# Patient Record
Sex: FEMALE | Race: BLACK OR AFRICAN AMERICAN | NOT HISPANIC OR LATINO | Employment: FULL TIME | URBAN - METROPOLITAN AREA
[De-identification: names, ages, dates, MRNs, and addresses within clinical notes are randomized per-mention and may not be internally consistent; named-entity substitution may affect disease eponyms.]

---

## 2024-10-24 ENCOUNTER — TELEPHONE (OUTPATIENT)
Dept: OBSTETRICS AND GYNECOLOGY | Facility: CLINIC | Age: 29
End: 2024-10-24
Payer: COMMERCIAL

## 2024-10-24 NOTE — TELEPHONE ENCOUNTER
Caller: JOE PERERA    Relationship: SELF    Best call back number: [phone]    Who is your current provider: NEW OB APPT WITH Rogerio Martinez MD 11-1-24    Is your current provider offboarding? NO    Who would you like your new provider to be: PT IS REQUESTING FEMALE PROVIDER

## 2024-11-05 ENCOUNTER — INITIAL PRENATAL (OUTPATIENT)
Dept: OBSTETRICS AND GYNECOLOGY | Facility: CLINIC | Age: 29
End: 2024-11-05
Payer: COMMERCIAL

## 2024-11-05 VITALS — DIASTOLIC BLOOD PRESSURE: 64 MMHG | BODY MASS INDEX: 22.15 KG/M2 | SYSTOLIC BLOOD PRESSURE: 104 MMHG | WEIGHT: 113.4 LBS

## 2024-11-05 DIAGNOSIS — Z36.9 ENCOUNTER FOR ANTENATAL SCREENING: Primary | ICD-10-CM

## 2024-11-05 NOTE — PROGRESS NOTES
Initial ob visit     CC- Here for care of pregnancy        Li Stinson is a 29 y.o. female, , who presents for her first obstetrical visit.  Patient's last menstrual period was 2024.. Her ELVIS is 2025, by Ultrasound. Current GA is 12w6d.     Initial positive test date : 10/01/24, UPT        Her periods are every 21 days.  Prior obstetric issues: none  Patient's past medical history is significant for:  none .  Family history of genetic issues (includes FOB): none  Prior infections concerning in pregnancy (Rash, fever in last 2 weeks): No  Varicella Hx - history of chicken pox  Prior testing for Cystic Fibrosis Carrier or Sickle Cell Trait- none  Prepregnancy BMI - Body mass index is 22.15 kg/m².  History of STD: yes GC/CHLAMYDIA  Hx of HSV for patient or partner: no  Ultrasound Today: yes, SIUP.  bpm.    OB History    Para Term  AB Living   3 2 2     2   SAB IAB Ectopic Molar Multiple Live Births             2      # Outcome Date GA Lbr Igor/2nd Weight Sex Type Anes PTL Lv   3 Current            2 Term 17 38w0d   F Vag-Spont   ROE   1 Term 12 39w0d   M Vag-Spont   ROE       Additional Pertinent History   Last Pap : 10/01/24 Result: negative HPV: negative per pt     Last Completed Pap Smear       This patient has no relevant Health Maintenance data.          History of abnormal Pap smear: no  Family history of uterine, colon, breast, or ovarian cancer: no  Feelings of Anxiety or Depression: no  Tobacco Usage?: No   Alcohol/Drug Use?: NO  Over the age of 35 at delivery: no  Genetic Screening: desires cell free DNA  Flu Status: Declines    PMH  No current outpatient medications on file.     Past Medical History:   Diagnosis Date    Bacterial vaginosis     Trichimoniasis     Yeast vaginitis         Past Surgical History:   Procedure Laterality Date    NO PAST SURGERIES         Review of Systems   Review of Systems    Patient Reports:  none  Patient  Denies:excessive nausea , excessive vomiting, and vaginal bleeding  All systems reviewed and otherwise normal.    I have reviewed and agree with the HPI, ROS, and historical information as entered above. Kalia Alvarado MD      /64   Wt 51.4 kg (113 lb 6.4 oz)   LMP 2024   BMI 22.15 kg/m²     The additional following portions of the patient's history were reviewed and updated as appropriate: allergies, current medications, past family history, past medical history, past social history, past surgical history, and problem list.    Physical Exam  General:  well developed; well nourished  no acute distress  mentation appropriate   Chest/Respiratory: No labored breathing, normal respiratory effort, normal appearance, no respiratory noises noted   Heart:  normal rate, regular rhythm,  no murmurs, rubs, or gallops   Thyroid: normal to inspection and palpation   Breasts:  Not performed.   Abdomen: soft, non-tender; no masses  no umbilical or inguinal hernias are present  no hepato-splenomegaly   Pelvis: Not performed.        Assessment and Plan    Problem List Items Addressed This Visit    None  Visit Diagnoses       Encounter for  screening    -  Primary    Relevant Orders    Obstetric Panel    HIV-1 / O / 2 Ag / Antibody    Urine Culture - Urine, Urine, Clean Catch    Urinalysis With Microscopic - Urine, Clean Catch    Chlamydia trachomatis, Neisseria gonorrhoeae, PCR - Urine, Urine, Clean Catch    Urine Drug Screen - Urine, Clean Catch    [id number] PLUS Core+SCA+ESS - Blood,            Pregnancy at 12w6d  Reviewed routine prenatal care with the office and educational materials given  Discussed options for genetic testing including first trimester nuchal translucency screen, genetic disease carrier testing, quadruple screen, and NIPT  Return in about 4 weeks (around 12/3/2024).      Kaila Alvarado MD  2024

## 2024-11-06 ENCOUNTER — TELEPHONE (OUTPATIENT)
Dept: OBSTETRICS AND GYNECOLOGY | Facility: CLINIC | Age: 29
End: 2024-11-06
Payer: COMMERCIAL

## 2024-11-06 DIAGNOSIS — Z34.91 FIRST TRIMESTER PREGNANCY: Primary | ICD-10-CM

## 2024-11-06 LAB
ABO GROUP BLD: ABNORMAL
AMPHETAMINES UR QL SCN: NEGATIVE NG/ML
APPEARANCE UR: CLEAR
BACTERIA #/AREA URNS HPF: NORMAL /[HPF]
BARBITURATES UR QL SCN: NEGATIVE NG/ML
BASOPHILS # BLD AUTO: 0 X10E3/UL (ref 0–0.2)
BASOPHILS NFR BLD AUTO: 0 %
BENZODIAZ UR QL SCN: NEGATIVE NG/ML
BILIRUB UR QL STRIP: NEGATIVE
BLD GP AB SCN SERPL QL: NEGATIVE
BZE UR QL SCN: NEGATIVE NG/ML
CANNABINOIDS UR QL SCN: POSITIVE NG/ML
CASTS URNS QL MICRO: NORMAL /LPF
COLOR UR: YELLOW
CREAT UR-MCNC: 23.1 MG/DL (ref 20–300)
EOSINOPHIL # BLD AUTO: 0.1 X10E3/UL (ref 0–0.4)
EOSINOPHIL NFR BLD AUTO: 0 %
EPI CELLS #/AREA URNS HPF: NORMAL /HPF (ref 0–10)
ERYTHROCYTE [DISTWIDTH] IN BLOOD BY AUTOMATED COUNT: 13.3 % (ref 11.7–15.4)
GLUCOSE UR QL STRIP: NEGATIVE
HBV SURFACE AG SERPL QL IA: NEGATIVE
HCT VFR BLD AUTO: 39.5 % (ref 34–46.6)
HCV IGG SERPL QL IA: NON REACTIVE
HGB BLD-MCNC: 12.5 G/DL (ref 11.1–15.9)
HGB UR QL STRIP: NEGATIVE
HIV 1+2 AB+HIV1 P24 AG SERPL QL IA: NON REACTIVE
IMM GRANULOCYTES # BLD AUTO: 0 X10E3/UL (ref 0–0.1)
IMM GRANULOCYTES NFR BLD AUTO: 0 %
KETONES UR QL STRIP: NEGATIVE
LABORATORY COMMENT REPORT: ABNORMAL
LEUKOCYTE ESTERASE UR QL STRIP: ABNORMAL
LYMPHOCYTES # BLD AUTO: 3.3 X10E3/UL (ref 0.7–3.1)
LYMPHOCYTES NFR BLD AUTO: 26 %
MCH RBC QN AUTO: 29.6 PG (ref 26.6–33)
MCHC RBC AUTO-ENTMCNC: 31.6 G/DL (ref 31.5–35.7)
MCV RBC AUTO: 93 FL (ref 79–97)
METHADONE UR QL SCN: NEGATIVE NG/ML
MICRO URNS: ABNORMAL
MONOCYTES # BLD AUTO: 0.6 X10E3/UL (ref 0.1–0.9)
MONOCYTES NFR BLD AUTO: 4 %
NEUTROPHILS # BLD AUTO: 8.6 X10E3/UL (ref 1.4–7)
NEUTROPHILS NFR BLD AUTO: 70 %
NITRITE UR QL STRIP: NEGATIVE
OPIATES UR QL SCN: NEGATIVE NG/ML
OXYCODONE+OXYMORPHONE UR QL SCN: NEGATIVE NG/ML
PCP UR QL: NEGATIVE NG/ML
PH UR STRIP: 6.5 [PH] (ref 5–7.5)
PH UR: 5.9 [PH] (ref 4.5–8.9)
PLATELET # BLD AUTO: 383 X10E3/UL (ref 150–450)
PROPOXYPH UR QL SCN: NEGATIVE NG/ML
PROT UR QL STRIP: NEGATIVE
RBC # BLD AUTO: 4.23 X10E6/UL (ref 3.77–5.28)
RBC #/AREA URNS HPF: NORMAL /HPF (ref 0–2)
RH BLD: POSITIVE
RPR SER QL: NON REACTIVE
RUBV IGG SERPL IA-ACNC: 3.72 INDEX
SP GR UR STRIP: 1.01 (ref 1–1.03)
UROBILINOGEN UR STRIP-MCNC: 0.2 MG/DL (ref 0.2–1)
WBC # BLD AUTO: 12.5 X10E3/UL (ref 3.4–10.8)
WBC #/AREA URNS HPF: NORMAL /HPF (ref 0–5)

## 2024-11-06 RX ORDER — PRENATAL WITH FERROUS FUM AND FOLIC ACID 3080; 920; 120; 400; 22; 1.84; 3; 20; 10; 1; 12; 200; 27; 25; 2 [IU]/1; [IU]/1; MG/1; [IU]/1; MG/1; MG/1; MG/1; MG/1; MG/1; MG/1; UG/1; MG/1; MG/1; MG/1; MG/1
1 TABLET ORAL DAILY
Qty: 90 TABLET | Refills: 3 | Status: SHIPPED | OUTPATIENT
Start: 2024-11-06

## 2024-11-06 NOTE — TELEPHONE ENCOUNTER
Caller: Li Stinson    Relationship: Self    Best call back number:[phone]      Who are you requesting to speak with (clinical staff,DR. QUESADA    What was the call regarding: PATIENT ADVISED TO PLEASE USE THE [facility] AT  [address] [place] [address] [address], [place] [id number],  [phone], WHEN SENDING FOR PRENATAL VITAMINS.

## 2024-11-07 ENCOUNTER — TELEPHONE (OUTPATIENT)
Dept: OBSTETRICS AND GYNECOLOGY | Facility: CLINIC | Age: 29
End: 2024-11-07
Payer: COMMERCIAL

## 2024-11-07 LAB
C TRACH RRNA SPEC QL NAA+PROBE: NEGATIVE
N GONORRHOEA RRNA SPEC QL NAA+PROBE: NEGATIVE

## 2024-11-07 RX ORDER — NITROFURANTOIN 25; 75 MG/1; MG/1
100 CAPSULE ORAL 2 TIMES DAILY
Qty: 14 CAPSULE | Refills: 0 | Status: SHIPPED | OUTPATIENT
Start: 2024-11-07 | End: 2024-11-14

## 2024-11-07 NOTE — TELEPHONE ENCOUNTER
Called patient she wanted to know what medicine she needed to take for uti. Macrobid rx called in she vu we will retest urine next visit VU

## 2024-11-07 NOTE — TELEPHONE ENCOUNTER
Patient of Dr. Alvarado;  @ 12w 1d.  Attempted to return patient's call. Left voice message to call us back.

## 2024-11-08 LAB
BACTERIA UR CULT: ABNORMAL
BACTERIA UR CULT: ABNORMAL
OTHER ANTIBIOTIC SUSC ISLT: ABNORMAL

## 2024-12-03 ENCOUNTER — ROUTINE PRENATAL (OUTPATIENT)
Dept: OBSTETRICS AND GYNECOLOGY | Facility: CLINIC | Age: 29
End: 2024-12-03
Payer: COMMERCIAL

## 2024-12-03 VITALS — WEIGHT: 117.6 LBS | SYSTOLIC BLOOD PRESSURE: 120 MMHG | BODY MASS INDEX: 22.97 KG/M2 | DIASTOLIC BLOOD PRESSURE: 74 MMHG

## 2024-12-03 DIAGNOSIS — Z34.92 PRENATAL CARE IN SECOND TRIMESTER: Primary | ICD-10-CM

## 2024-12-03 DIAGNOSIS — O23.42 URINARY TRACT INFECTION IN MOTHER DURING SECOND TRIMESTER OF PREGNANCY: ICD-10-CM

## 2024-12-03 DIAGNOSIS — Z36.89 ENCOUNTER FOR FETAL ANATOMIC SURVEY: ICD-10-CM

## 2024-12-03 LAB
GLUCOSE UR STRIP-MCNC: NEGATIVE MG/DL
PROT UR STRIP-MCNC: NEGATIVE MG/DL

## 2024-12-03 NOTE — PROGRESS NOTES
OB FOLLOW UP  CC- Here for care of pregnancy        Li Stinson is a 29 y.o.  16w6d patient being seen today for her obstetrical follow up visit. Patient reports no complaints    Her prenatal care is complicated by (and status) : see below.  There is no problem list on file for this patient.      Flu Status: Declines  Ultrasound Today: No    AFP: desires    ROS -   Patient Denies: leaking of fluid, vaginal bleeding, dysuria, excessive vomiting, and more than 6 contractions per hour  All other systems reviewed and are negative.       The additional following portions of the patient's history were reviewed and updated as appropriate: allergies and current medications.      I have reviewed and agree with the HPI, ROS, and historical information as entered above. Kaila Alvarado MD          EXAM:     Prenatal Vitals  BP: 120/74  Weight: 53.3 kg (117 lb 9.6 oz)   Fetal Heart Rate: pos         Urine Glucose Read-only: Negative  Urine Protein Read-only: Negative           Assessment and Plan    Problem List Items Addressed This Visit    None  Visit Diagnoses       Prenatal care in second trimester    -  Primary    Relevant Orders    POC Urinalysis Dipstick (Completed)    Urine Culture - Urine, Urine, Clean Catch    Alpha Fetoprotein, Maternal    Urinary tract infection in mother during second trimester of pregnancy        Relevant Orders    Urine Culture - Urine, Urine, Clean Catch    Encounter for fetal anatomic survey        Relevant Orders    US Ob 14 + Weeks Single or First Gestation            Pregnancy at 16w6d  Fetal status reassuring.   Counseled on MSAFP alone in relation to OTD and placental issues.    Anatomy scan next visit.   Activity and Exercise discussed.  Patient is on Prenatal vitamins  Return in about 23 days (around 2024), or Please put me on scheulde from 10-2 on . Thank you, for US with Next Visit.    Kaila Alvarado MD  2024

## 2024-12-05 LAB
AFP INTERP SERPL-IMP: NORMAL
AFP INTERP SERPL-IMP: NORMAL
AFP MOM SERPL: 1.27
AFP SERPL-MCNC: 61.1 NG/ML
AGE AT DELIVERY: 31.2 YR
BACTERIA UR CULT: NORMAL
BACTERIA UR CULT: NORMAL
GA METHOD: NORMAL
GA: 16.9 WEEKS
IDDM PATIENT QL: NO
LABORATORY COMMENT REPORT: NORMAL
MULTIPLE PREGNANCY: NO
NEURAL TUBE DEFECT RISK FETUS: NORMAL %
RESULT: NORMAL

## 2024-12-26 ENCOUNTER — ROUTINE PRENATAL (OUTPATIENT)
Dept: OBSTETRICS AND GYNECOLOGY | Facility: CLINIC | Age: 29
End: 2024-12-26
Payer: COMMERCIAL

## 2024-12-26 VITALS — BODY MASS INDEX: 23.94 KG/M2 | SYSTOLIC BLOOD PRESSURE: 120 MMHG | WEIGHT: 122.6 LBS | DIASTOLIC BLOOD PRESSURE: 78 MMHG

## 2024-12-26 DIAGNOSIS — Z36.2 ENCOUNTER FOR FOLLOW-UP ULTRASOUND OF FETAL ANATOMY: ICD-10-CM

## 2024-12-26 DIAGNOSIS — Z34.92 PRENATAL CARE IN SECOND TRIMESTER: Primary | ICD-10-CM

## 2024-12-26 LAB
GLUCOSE UR STRIP-MCNC: NEGATIVE MG/DL
PROT UR STRIP-MCNC: NEGATIVE MG/DL

## 2024-12-26 NOTE — PROGRESS NOTES
OB FOLLOW UP  CC- Here for care of pregnancy        Li Stinson is a 29 y.o.  20w1d patient being seen today for her obstetrical follow up visit. Patient reports occasional headaches without vision changes that are relieved with rest..     Her prenatal care is complicated by (and status) : see below.  There is no problem list on file for this patient.      Flu Status: Declines  Ultrasound Today: Yes  AFP was already completed and was normal.    ROS -     Patient Denies: leaking of fluid, vaginal bleeding, dysuria, excessive vomiting, and more than 6 contractions per hour  Fetal Movement : Yes  All other systems reviewed and are negative.       The additional following portions of the patient's history were reviewed and updated as appropriate: allergies and current medications.      I have reviewed and agree with the HPI, ROS, and historical information as entered above. Kaila Alvarado MD      /78   Wt 55.6 kg (122 lb 9.6 oz)   LMP 2024   BMI 23.94 kg/m²       EXAM:     Prenatal Vitals  BP: 120/78  Weight: 55.6 kg (122 lb 9.6 oz)   Fetal Heart Rate: 133          Urine Glucose Read-only: Negative  Urine Protein Read-only: Negative       Assessment and Plan    Problem List Items Addressed This Visit    None  Visit Diagnoses       Prenatal care in second trimester    -  Primary    Relevant Orders    POC Urinalysis Dipstick (Completed)    Encounter for follow-up ultrasound of fetal anatomy        Relevant Orders    US Ob Follow Up Transabdominal Approach            Pregnancy at 20w1d  Anatomy scan today is incomplete, follow up in 4 weeks for additional views. Anatomy that was visualized was within normal limits.  Fetal status reassuring.   Activity and Exercise discussed.  Patient is on Prenatal vitamins  Return in about 4 weeks (around 2025) for US with Next Visit.      Kaila Alvarado MD  2024

## 2024-12-27 ENCOUNTER — REFERRAL TRIAGE (OUTPATIENT)
Dept: LABOR AND DELIVERY | Facility: HOSPITAL | Age: 29
End: 2024-12-27
Payer: COMMERCIAL

## 2025-01-09 ENCOUNTER — PATIENT OUTREACH (OUTPATIENT)
Dept: LABOR AND DELIVERY | Facility: HOSPITAL | Age: 30
End: 2025-01-09
Payer: COMMERCIAL

## 2025-01-09 NOTE — OUTREACH NOTE
[facility]  Unable to Reach    Questions/Answers      Flowsheet Row Responses   Pending Outreach Confirm Patient Interest   Call Attempt First   Outcome No answer/busy, Left message, [employer] message sent to patient   Next Call Attempt Date 01/15/25            Confirmation of interest letter sent via [employer] message. Contact information provided. Response requested.    ANTONIO Dupree RN  Maternity Nurse Navigator    1/9/2025, 12:07 EST

## 2025-01-09 NOTE — OUTREACH NOTE
"[facility]  Enrollment    Current Estimated Gestational Age: 22w1d    Questions/Answers      Flowsheet Row Responses   Would like to participate? Yes   Date of Intake Visit 01/09/25            [facility]  Intake    Current Estimated Gestational Age: 22w1d    Intake Assessment      Flowsheet Row Responses   Best Method for Contacting Cell   Currently Employed Yes  [[employer]]   Able to keep appointments as scheduled Yes   Gender(s) and Name(s) Girl \"Jaylin\"   Do you have a dentist? No   Resources Presently Utilizing: Other  [Has WIC appointment in February]   Other Resources Utilizing: SNAP   Maternal Warning Signs Provided   Other: Provided   Other Education HANDS, How to find a pediatrician, Insurance benefits/Incentives, Mental Health Services, SNAP Benefits, WIC Benefits            Learning Assessment      Flowsheet Row Responses   Relationship Patient   Learner Name Li   Does the learner have any barriers to learning? No Barriers   What is the preferred language of the learner for medical teaching? English   Is an  required? No   How does the learner prefer to learn new concepts? Reading, Pictures/Video            Tobacco, Alcohol, and Drug History     reports that she has quit smoking. Her smoking use included cigars. She has never used smokeless tobacco.   reports no history of alcohol use.   reports that she does not currently use drugs after having used the following drugs: Marijuana.      [facility]  Check-In    Current Estimated Gestational Age: 22w1d      Questions/Answers      Flowsheet Row Responses   Best Method for Contacting Cell   Demographics Reviewed Yes   Currently Employed Yes  [[employer]]   Able to keep appointments as scheduled Yes   Gender(s) and Name(s) Girl \"Jaylin\"   Baby Active/Feeling Fetal Movemen Yes   How are you presently feeling? Good   Are you having any of the following symptoms? Pain  [back pain]   Questions regarding prenatal visits " or tests to be ordered? No   Education related to new diagnoses/home equipment No   Resource/Environmental Concerns None   Do you have any questions related to your care experience, your pregnancy, plans for delivery, any concerns, etc? No   Other Education HANDS, How to find a pediatrician, Insurance benefits/Incentives, Mental Health Services, SNAP Benefits, [place] Benefits            Li is seeing Dr. Alvarado for her prenatal care. States she is generally healthy and has a benign medical history. States she has experienced some anxiety and mild depression but does not see a counselor or take any meds for treatment.    She denies trauma/violence exposure in her life in. She has no SI/HI and currently feels safe. Discussed that we will be screening periodically for  and postpartum changes with mood looking for trends which may need to be addressed. VU.    She is feeling well, reports good fetal movement. She is nervous about labor, birth and caring for a .  Prenatal and breastfeeding education discussed. Discussed bonus benefits of pregnancy from insurance for potential assistance with some infant care items.     SDOH reviewed with patient. No urgent needs identified at present. States she gets SNAP benefits and has an appointment with [facility] next month. She has has times when she worries about running out of food but never has. Encouraged to let me know if she feels like she needs more help with food. Reports reliable transportation and stable housing. Antione GARCÍA, is involved and supportive. States she also has two sisters who live nearby and her mother will come from [place] when she delivers to help her.     Multiple resources provided via [employer] message. Contact information provided. Encouraged to call with questions, concerns, or for support. Will plan f/u around 28 weeks for wellness and resource needs check in.    ANTONIO Dupree RN  Maternity Nurse Navigator    2025, 16:06 EST

## 2025-01-22 ENCOUNTER — PATIENT OUTREACH (OUTPATIENT)
Dept: LABOR AND DELIVERY | Facility: HOSPITAL | Age: 30
End: 2025-01-22
Payer: COMMERCIAL

## 2025-01-22 NOTE — OUTREACH NOTE
[facility]    Returned Li's VM asking for resources for assistance with rent payments.  She reports being short on money to cover rent this month, she will have funds after her next paycheck but deadline is prior to that date. Multiple resources sent to her [employer] account. Encouraged her to call or message with any additional questions, needs or concerns.     Mercedes Dupree RN  Maternity Nurse Navigator    1/22/2025, 14:00 EST

## 2025-01-23 ENCOUNTER — ROUTINE PRENATAL (OUTPATIENT)
Dept: OBSTETRICS AND GYNECOLOGY | Facility: CLINIC | Age: 30
End: 2025-01-23
Payer: COMMERCIAL

## 2025-01-23 VITALS — BODY MASS INDEX: 24.61 KG/M2 | DIASTOLIC BLOOD PRESSURE: 62 MMHG | SYSTOLIC BLOOD PRESSURE: 112 MMHG | WEIGHT: 126 LBS

## 2025-01-23 DIAGNOSIS — O36.5939 SGA (SMALL FOR GESTATIONAL AGE), FETAL, AFFECTING CARE OF MOTHER, ANTEPARTUM, THIRD TRIMESTER, OTHER FETUS: ICD-10-CM

## 2025-01-23 DIAGNOSIS — Z3A.24 24 WEEKS GESTATION OF PREGNANCY: Primary | ICD-10-CM

## 2025-01-23 LAB
GLUCOSE UR STRIP-MCNC: NEGATIVE MG/DL
PROT UR STRIP-MCNC: NEGATIVE MG/DL

## 2025-01-23 NOTE — PROGRESS NOTES
OB FOLLOW UP  CC- Here for care of pregnancy        Li Stinson is a 29 y.o.  24w1d patient being seen today for her obstetrical follow up visit. Patient reports intermittent headaches, low back pain, and falling yesterday. Patient states that she fell on her bottom. Patient states that since then she has been having intermittent low back pain.     Her prenatal care is complicated by (and status) : see below.  Patient Active Problem List   Diagnosis    24 weeks gestation of pregnancy       Flu Status: Declines  Ultrasound Today: Yes  Reviewed 1 hr glucose testing and TDAP next visit.    ROS -   Patient Denies: leaking of fluid, vaginal bleeding, dysuria, excessive vomiting, and more than 6 contractions per hour  Fetal Movement : normal  All other systems reviewed and are negative.       The additional following portions of the patient's history were reviewed and updated as appropriate: allergies and current medications.      I have reviewed and agree with the HPI, ROS, and historical information as entered above. Kaila Alvarado MD      /62   Wt 57.2 kg (126 lb)   LMP 2024   BMI 24.61 kg/m²       EXAM:     Prenatal Vitals  BP: 112/62  Weight: 57.2 kg (126 lb)   Fetal Heart Rate: 147/US               Urine Glucose Read-only: Negative  Urine Protein Read-only: Negative       Assessment and Plan    Problem List Items Addressed This Visit          Gravid and     24 weeks gestation of pregnancy - Primary    Overview     cfDNA- low risk, girl  H/o  x2         Relevant Orders    POC Urinalysis Dipstick (Completed)     Other Visit Diagnoses       SGA (small for gestational age), fetal, affecting care of mother, antepartum, third trimester, other fetus        Relevant Orders    US Ob Follow Up Transabdominal Approach            Pregnancy at 24w1d  Fetal status reassuring.  anatomy scan completed today and within normal limits.  EFW lower limits.  Repeat for growth next visit.  1  hour gtt, CBC, Antibody screen, TDAP, and RPR next visit. Instructions given  Discussed/encouraged TDAP vaccination after 28 weeks  Activity and Exercise discussed.  Return in about 4 weeks (around 2/20/2025) for US with Next Visit, One hour glucola.      Kaila Alvarado MD  01/23/2025

## 2025-02-20 ENCOUNTER — PATIENT OUTREACH (OUTPATIENT)
Dept: LABOR AND DELIVERY | Facility: HOSPITAL | Age: 30
End: 2025-02-20
Payer: COMMERCIAL

## 2025-02-20 NOTE — OUTREACH NOTE
[facility]  Check-In    Current Estimated Gestational Age: 28w1d      Questions/Answers      Flowsheet Row Responses   Best Method for Contacting Cell   Demographics Reviewed Yes   Currently Employed Yes   Able to keep appointments as scheduled Yes   Baby Active/Feeling Fetal Movemen Yes   How are you presently feeling? Good   Are you having any of the following symptoms? --  [Denies]   Questions regarding prenatal visits or tests to be ordered? No   Education related to new diagnoses/home equipment No   Resource/Environmental Concerns None   Do you have any questions related to your care experience, your pregnancy, plans for delivery, any concerns, etc? No   Other Education Other, Birth Plan   Other Education Comment Fetal kick counts, childbirth preparation classes, breastfeeding education            Feeling well and reports good fetal movement. Denies any concerning symptoms but discussed what to watch out for. Discussed that she still has time for childbirth education if she is interested. Discussed breastfeeding video and outpatient lactation clinic support as well as WIC support.     Updated resources provided via [employer] message. Contact information provided. Encouraged to call with questions, concerns, or for support. Will plan f/u around 35 weeks to ensure she has everything she needs in place and feels ready for delivery.    ANTONIO Dupree RN  Maternity Nurse Navigator    2/20/2025, 10:58 EST

## 2025-02-27 ENCOUNTER — ROUTINE PRENATAL (OUTPATIENT)
Dept: OBSTETRICS AND GYNECOLOGY | Facility: CLINIC | Age: 30
End: 2025-02-27
Payer: COMMERCIAL

## 2025-02-27 VITALS — SYSTOLIC BLOOD PRESSURE: 120 MMHG | DIASTOLIC BLOOD PRESSURE: 70 MMHG | WEIGHT: 136 LBS | BODY MASS INDEX: 26.56 KG/M2

## 2025-02-27 DIAGNOSIS — O36.5939 SGA (SMALL FOR GESTATIONAL AGE), FETAL, AFFECTING CARE OF MOTHER, ANTEPARTUM, THIRD TRIMESTER, OTHER FETUS: ICD-10-CM

## 2025-02-27 DIAGNOSIS — Z34.93 PRENATAL CARE IN THIRD TRIMESTER: Primary | ICD-10-CM

## 2025-02-27 LAB
GLUCOSE UR STRIP-MCNC: NEGATIVE MG/DL
PROT UR STRIP-MCNC: NEGATIVE MG/DL

## 2025-02-27 NOTE — PROGRESS NOTES
OB FOLLOW UP  CC- Here for care of pregnancy        Li Stinson is a 30 y.o.  29w1d patient being seen today for her obstetrical follow up. Patient reports occasional headaches that are relieved with rest.     Patient undergoing Glucola testing today. She is due for her testing at 0910.       MBT: A+  Rhogam: is not indicated.  28 week packet: reviewed with patient , counseled on fetal movement , pediatrician list reviewed, breast pump discussed, and childbirth classes reviewed  TDAP: given today  Flu Status: Declines  Ultrasound Today: Yes,  bpm. Cephalic. EFW 2 lb 6 oz.    Her prenatal care is complicated by (and status) : see below.  Patient Active Problem List   Diagnosis    SGA (small for gestational age), fetal, affecting care of mother, antepartum, third trimester, other fetus         ROS -   Patient Denies: Loss of Fluid, Vaginal Spotting, Vision Changes, Nausea , Vomiting , Contractions, Epigastric pain, and skin itching  Fetal Movement : normal  Other than what is documented in the HPI, all other systems reviewed and are negative.     The additional following portions of the patient's history were reviewed and updated as appropriate: allergies and current medications.    I have reviewed and agree with the HPI, ROS, and historical information as entered above. Kaila Alvarado MD      /70   Wt 61.7 kg (136 lb)   LMP 2024   BMI 26.56 kg/m²         EXAM:     Prenatal Vitals  BP: 120/70  Weight: 61.7 kg (136 lb)   Fetal Heart Rate: 122               Urine Glucose Read-only: Negative  Urine Protein Read-only: Negative         Assessment and Plan    Problem List Items Addressed This Visit          Other    SGA (small for gestational age), fetal, affecting care of mother, antepartum, third trimester, other fetus     Other Visit Diagnoses       Prenatal care in third trimester    -  Primary    Relevant Orders    CBC (No Diff)    Gestational Screen 1 Hr ([facility])    Antibody  Screen    RPR, Rfx Qn RPR / Confirm TP    POC Urinalysis Dipstick (Completed)    Tdap Vaccine Greater Than or Equal To 6yo IM (Completed)            Pregnancy at 29w1d  1 hr Glucola, CBC, RPR. Antibody screen and TDAP today  Fetal movement/PTL or Labor precautions  U/S reviewed and given percentiles will have PDC evaluate.  Overall growth percentile stable with normal fluid.  Activity and Exercise discussed.  No follow-ups on file.        Kaila Alvarado MD  02/27/2025

## 2025-02-28 LAB
BLD GP AB SCN SERPL QL: NEGATIVE
ERYTHROCYTE [DISTWIDTH] IN BLOOD BY AUTOMATED COUNT: 12.2 % (ref 12.3–15.4)
GLUCOSE 1H P 50 G GLC PO SERPL-MCNC: 44 MG/DL (ref 65–139)
HCT VFR BLD AUTO: 29.8 % (ref 34–46.6)
HGB BLD-MCNC: 9.5 G/DL (ref 12–15.9)
MCH RBC QN AUTO: 29.3 PG (ref 26.6–33)
MCHC RBC AUTO-ENTMCNC: 31.9 G/DL (ref 31.5–35.7)
MCV RBC AUTO: 92 FL (ref 79–97)
PLATELET # BLD AUTO: 290 10*3/MM3 (ref 140–450)
RBC # BLD AUTO: 3.24 10*6/MM3 (ref 3.77–5.28)
RPR SER QL: NON REACTIVE
WBC # BLD AUTO: 8.24 10*3/MM3 (ref 3.4–10.8)

## 2025-03-13 ENCOUNTER — ROUTINE PRENATAL (OUTPATIENT)
Dept: OBSTETRICS AND GYNECOLOGY | Facility: CLINIC | Age: 30
End: 2025-03-13
Payer: COMMERCIAL

## 2025-03-13 VITALS — WEIGHT: 137 LBS | BODY MASS INDEX: 26.76 KG/M2 | DIASTOLIC BLOOD PRESSURE: 72 MMHG | SYSTOLIC BLOOD PRESSURE: 118 MMHG

## 2025-03-13 DIAGNOSIS — Z34.93 PRENATAL CARE IN THIRD TRIMESTER: ICD-10-CM

## 2025-03-13 DIAGNOSIS — O36.5939 SGA (SMALL FOR GESTATIONAL AGE), FETAL, AFFECTING CARE OF MOTHER, ANTEPARTUM, THIRD TRIMESTER, OTHER FETUS: Primary | ICD-10-CM

## 2025-03-13 LAB
GLUCOSE UR STRIP-MCNC: NEGATIVE MG/DL
PROT UR STRIP-MCNC: NEGATIVE MG/DL

## 2025-03-13 RX ORDER — FERROUS SULFATE 324(65)MG
324 TABLET, DELAYED RELEASE (ENTERIC COATED) ORAL
COMMUNITY

## 2025-03-13 NOTE — PROGRESS NOTES
OB FOLLOW UP  CC- Here for care of pregnancy        Li Stinson is a 30 y.o.  31w1d patient being seen today for her obstetrical follow up visit. Patient reports occasional cramping and lightheadedness. Patient states that she has begun taking an iron supplement and this is helping her lightheadedness.    Her prenatal care is complicated by (and status) :  see below.  Patient Active Problem List   Diagnosis    SGA (small for gestational age), fetal, affecting care of mother, antepartum, third trimester, other fetus       Flu Status: Declines  TDAP status:  Already given  Rhogam status: Was not indicated  28 week labs: Reviewed and Labs show anemia. She is taking additional iron supplement.  Ultrasound Today: No  Non Stress Test: No    ROS -   Patient Denies: Loss of Fluid, Vaginal Spotting, Vision Changes, Headaches, Nausea , Vomiting , Contractions, Epigastric pain, and skin itching  Fetal Movement : normal  Other than what is documented in the HPI, all other systems reviewed and are negative.     The additional following portions of the patient's history were reviewed and updated as appropriate: allergies and current medications.    I have reviewed and agree with the HPI, ROS, and historical information as entered above. Kaila Alvarado MD      /72   Wt 62.1 kg (137 lb)   LMP 2024   BMI 26.76 kg/m²         EXAM:     Prenatal Vitals  BP: 118/72  Weight: 62.1 kg (137 lb)   Fetal Heart Rate: pos               Urine Glucose Read-only: Negative  Urine Protein Read-only: Negative           Assessment and Plan    Problem List Items Addressed This Visit          Other    SGA (small for gestational age), fetal, affecting care of mother, antepartum, third trimester, other fetus - Primary    Overview   To PDC         Relevant Orders    [facility]     Other Visit Diagnoses         Prenatal care in third trimester        Relevant Orders    [facility]    POC Urinalysis Dipstick (Completed)            Pregnancy at 31w1d  SGA- PDC scan in 2 weeks.  Anemia - iron replacement and repeat in 2 weeks.  May need iron transfusion.  Fetal status reassuring.  28 week labs reviewed.    Activity and Exercise discussed.  Fetal movement/PTL or Labor precautions  Return in about 2 weeks (around 3/27/2025), or after PDC, for PDC same day.    Kaila Alvarado MD  03/13/2025

## 2025-03-27 ENCOUNTER — ROUTINE PRENATAL (OUTPATIENT)
Dept: OBSTETRICS AND GYNECOLOGY | Facility: CLINIC | Age: 30
End: 2025-03-27
Payer: COMMERCIAL

## 2025-03-27 ENCOUNTER — OFFICE VISIT (OUTPATIENT)
Dept: OBSTETRICS AND GYNECOLOGY | Facility: HOSPITAL | Age: 30
End: 2025-03-27
Payer: COMMERCIAL

## 2025-03-27 ENCOUNTER — HOSPITAL ENCOUNTER (OUTPATIENT)
Dept: WOMENS IMAGING | Facility: HOSPITAL | Age: 30
Discharge: HOME | End: 2025-03-27
Admitting: OBSTETRICS & GYNECOLOGY
Payer: COMMERCIAL

## 2025-03-27 VITALS — DIASTOLIC BLOOD PRESSURE: 74 MMHG | BODY MASS INDEX: 27.3 KG/M2 | WEIGHT: 139.8 LBS | SYSTOLIC BLOOD PRESSURE: 117 MMHG

## 2025-03-27 VITALS — DIASTOLIC BLOOD PRESSURE: 62 MMHG | BODY MASS INDEX: 27.34 KG/M2 | SYSTOLIC BLOOD PRESSURE: 102 MMHG | WEIGHT: 140 LBS

## 2025-03-27 DIAGNOSIS — Z34.93 PRENATAL CARE IN THIRD TRIMESTER: ICD-10-CM

## 2025-03-27 DIAGNOSIS — O36.5939 SGA (SMALL FOR GESTATIONAL AGE), FETAL, AFFECTING CARE OF MOTHER, ANTEPARTUM, THIRD TRIMESTER, OTHER FETUS: ICD-10-CM

## 2025-03-27 DIAGNOSIS — Z34.93 PRENATAL CARE IN THIRD TRIMESTER: Primary | ICD-10-CM

## 2025-03-27 DIAGNOSIS — O36.5939 SGA (SMALL FOR GESTATIONAL AGE), FETAL, AFFECTING CARE OF MOTHER, ANTEPARTUM, THIRD TRIMESTER, OTHER FETUS: Primary | ICD-10-CM

## 2025-03-27 DIAGNOSIS — O36.5931 IUGR (INTRAUTERINE GROWTH RESTRICTION) AFFECTING CARE OF MOTHER, THIRD TRIMESTER, FETUS 1: ICD-10-CM

## 2025-03-27 DIAGNOSIS — Z3A.33 33 WEEKS GESTATION OF PREGNANCY: ICD-10-CM

## 2025-03-27 DIAGNOSIS — Z34.90 PREGNANCY, UNSPECIFIED GESTATIONAL AGE: ICD-10-CM

## 2025-03-27 LAB
GLUCOSE UR STRIP-MCNC: NEGATIVE MG/DL
PROT UR STRIP-MCNC: NEGATIVE MG/DL

## 2025-03-27 PROCEDURE — 76811 OB US DETAILED SNGL FETUS: CPT

## 2025-03-27 PROCEDURE — 76820 UMBILICAL ARTERY ECHO: CPT

## 2025-03-27 PROCEDURE — 76819 FETAL BIOPHYS PROFIL W/O NST: CPT

## 2025-03-27 NOTE — PROGRESS NOTES
"Documentation of the ultrasound findings, images, and interpretations will be available in the patient's Viewpoint report which is located in the imaging tab in chart review.    Maternal/Fetal Medicine Consult Note     Name: Li Stinson    : 1995     MRN: [id number]     Referring Provider: Kaila Alvarado MD    Chief Complaint  SGA    Subjective     History of Present Illness:  Li Stinson is a 30 y.o.  33w1d who presents today for S<D    ELVIS: Estimated Date of Delivery: 25     ROS:   As noted in HPI.     Past Medical History:   Diagnosis Date    Bacterial vaginosis     Trichimoniasis     Yeast vaginitis       Past Surgical History:   Procedure Laterality Date    NO PAST SURGERIES        OB History          3    Para   2    Term   2            AB        Living   2         SAB        IAB        Ectopic        Molar        Multiple        Live Births   2                Objective     Vital Signs  /74   Wt 63.4 kg (139 lb 12.8 oz)   LMP 2024   Estimated body mass index is 27.3 kg/m² as calculated from the following:    Height as of 8/3/16: 152.4 cm (60\").    Weight as of this encounter: 63.4 kg (139 lb 12.8 oz).    Physical Exam    Ultrasound Impression:   See Viewpoint    Assessment and Plan     Li Stinson is a 30 y.o.  33w1d who presents today for S<D    Diagnoses and all orders for this visit:    1. SGA (small for gestational age), fetal, affecting care of mother, antepartum, third trimester, other fetus (Primary)  Assessment & Plan:  Patient referred for poor fetal growth noted in primary OB office.  Patient self is a healthy woman she reports no complications this pregnancy the patient does not smoke or vape.  Patient notes good fetal movement.    Ultrasound today demonstrates a normally grown fetus but with abdominal circumference at approximately the 2nd percentile.  Amniotic fluid volume and umbilical artery Dopplers are normal.  BPP is 8 out " of 8.    Patient appears to have moderate intrauterine growth restriction.  We would recommend twice-weekly  testing.  As the amniotic fluid volume and umbilical artery Dopplers are normal I believe the testing can be performed in the patient's primary OB office.  We will rescan the patient again in 2 weeks time to assess fetal growth and umbilical artery Dopplers.  If the abdominal circumference remains less than the 5th percentile we would recommend delivery at 37 weeks gestation.    Patient was counseled extensively regarding fetal movement will contact her provider immediately if she notices any decreased fetal movement.    Orders:  -     [facility]  Diagnostic Center; Future    2. Pregnancy, unspecified gestational age  -     [facility]  Diagnostic Center; Future         Follow Up  Return in about 2 weeks (around 4/10/2025).    I spent 20 minutes caring for the patient on the day of service. This included: obtaining or reviewing a separately obtained medical history, reviewing patient records, performing a medically appropriate exam and/or evaluation, counseling or educating the patient/family/caregiver, ordering medications, labs, and/or procedures and documenting such in the medical record. This does not include time spent on review and interpretation of other tests such as fetal ultrasound or the performance of other procedures such as amniocentesis or CVS.      Douglas A. Milligan, MD  Maternal Fetal Medicine, [facility]    Diagnostic Center     2025

## 2025-03-27 NOTE — PROGRESS NOTES
Patient denies any leaking of fluid, vaginal bleeding, or contractions.  NIPT negative.  Patient reports next follow-up appointment with Dr. Alvarado' office is today.

## 2025-03-27 NOTE — PROGRESS NOTES
OB FOLLOW UP  CC- Here for care of pregnancy        Li Stinson is a 30 y.o.  33w1d patient being seen today for her obstetrical follow up visit. Patient reports shira horses in her legs. Patient states that she has not been getting them since being off work the last couple of days.      Her prenatal care is complicated by (and status) : see below.  Patient Active Problem List   Diagnosis    SGA (small for gestational age), fetal, affecting care of mother, antepartum, third trimester, other fetus    Pregnancy    IUGR (intrauterine growth restriction) affecting care of mother, third trimester, fetus 1       Flu Status: Declines  RSV:  Would like to think about it  Ultrasound Today: Yes with PDC. FHR: 145bpm. Cephalic. SARAH: 17.1CM. HC: 7%. AC: 3%. EFW: 25%. PDC recommended  testing and US with dopplers at PDC in 2 weeks.  Non Stress Test: No    ROS -   Patient Denies: Loss of Fluid, Vaginal Spotting, Vision Changes, Headaches, Nausea , Vomiting , Contractions, Epigastric pain, and skin itching  Fetal Movement : normal  Other than what is documented in the HPI, all other systems reviewed and are negative.       The additional following portions of the patient's history were reviewed and updated as appropriate: allergies, current medications, and problem list.    I have reviewed and agree with the HPI, ROS, and historical information as entered above. Kaila Alvarado MD      /62   Wt 63.5 kg (140 lb)   LMP 2024   BMI 27.34 kg/m²       EXAM:     Prenatal Vitals  BP: 102/62  Weight: 63.5 kg (140 lb)   Fetal Heart Rate: PDC               Urine Glucose Read-only: Negative  Urine Protein Read-only: Negative           Assessment and Plan    Problem List Items Addressed This Visit          Gravid and     Pregnancy       Other    IUGR (intrauterine growth restriction) affecting care of mother, third trimester, fetus 1     Other Visit Diagnoses         Prenatal care in third  trimester    -  Primary    Relevant Orders    POC Urinalysis Dipstick (Completed)            Pregnancy at 33w1d  IUGR - off work.  Repeat scan in 2 weeks.  Start biweekly NST  Fetal status reassuring.   Activity and Exercise discussed.  Fetal movement/PTL or Labor precautions  Return in about 4 days (around 3/31/2025), or start biweekly NST, for NST.    Kaila Alvarado MD  03/27/2025

## 2025-03-27 NOTE — ASSESSMENT & PLAN NOTE
Patient referred for poor fetal growth noted in primary OB office.  Patient self is a healthy woman she reports no complications this pregnancy the patient does not smoke or vape.  Patient notes good fetal movement.    Ultrasound today demonstrates a normally grown fetus but with abdominal circumference at approximately the 2nd percentile.  Amniotic fluid volume and umbilical artery Dopplers are normal.  BPP is 8 out of 8.    Patient appears to have moderate intrauterine growth restriction.  We would recommend twice-weekly  testing.  As the amniotic fluid volume and umbilical artery Dopplers are normal I believe the testing can be performed in the patient's primary OB office.  We will rescan the patient again in 2 weeks time to assess fetal growth and umbilical artery Dopplers.  If the abdominal circumference remains less than the 5th percentile we would recommend delivery at 37 weeks gestation.    Patient was counseled extensively regarding fetal movement will contact her provider immediately if she notices any decreased fetal movement.

## 2025-03-27 NOTE — LETTER
"2025     Kaila Alvarado MD  [address] [facility] [address] [place] [id number]    Patient: Li Stinson   YOB: 1995   Date of Visit: 3/27/2025     Dear Kaila Alvarado MD:       Thank you for referring Li Stinson to me for evaluation. Below are the relevant portions of my assessment and plan of care.    If you have questions, please do not hesitate to call me. I look forward to following Li along with you.         Sincerely,        Douglas A. Milligan, MD        CC: No Recipients    Milligan, Douglas A, MD  [phone]  Sign when Signing Visit  Documentation of the ultrasound findings, images, and interpretations will be available in the patient's Viewpoint report which is located in the imaging tab in chart review.    Maternal/Fetal Medicine Consult Note     Name: Li Stinson    : 1995     MRN: [id number]     Referring Provider: Kaila Alvarado MD    Chief Complaint  SGA    Subjective     History of Present Illness:  Li Stinson is a 30 y.o.  33w1d who presents today for S<D    ELVIS: Estimated Date of Delivery: 25     ROS:   As noted in HPI.     Past Medical History:   Diagnosis Date   • Bacterial vaginosis    • Trichimoniasis    • Yeast vaginitis       Past Surgical History:   Procedure Laterality Date   • NO PAST SURGERIES        OB History          3    Para   2    Term   2            AB        Living   2         SAB        IAB        Ectopic        Molar        Multiple        Live Births   2                Objective     Vital Signs  /74   Wt 63.4 kg (139 lb 12.8 oz)   LMP 2024   Estimated body mass index is 27.3 kg/m² as calculated from the following:    Height as of 8/3/16: 152.4 cm (60\").    Weight as of this encounter: 63.4 kg (139 lb 12.8 oz).    Physical Exam    Ultrasound Impression:   See Viewpoint    Assessment and Plan     Li Stinson is a 30 y.o.  33w1d who presents today for " S<D    Diagnoses and all orders for this visit:    1. SGA (small for gestational age), fetal, affecting care of mother, antepartum, third trimester, other fetus (Primary)  Assessment & Plan:  Patient referred for poor fetal growth noted in primary OB office.  Patient self is a healthy woman she reports no complications this pregnancy the patient does not smoke or vape.  Patient notes good fetal movement.    Ultrasound today demonstrates a normally grown fetus but with abdominal circumference at approximately the 2nd percentile.  Amniotic fluid volume and umbilical artery Dopplers are normal.  BPP is 8 out of 8.    Patient appears to have moderate intrauterine growth restriction.  We would recommend twice-weekly  testing.  As the amniotic fluid volume and umbilical artery Dopplers are normal I believe the testing can be performed in the patient's primary OB office.  We will rescan the patient again in 2 weeks time to assess fetal growth and umbilical artery Dopplers.  If the abdominal circumference remains less than the 5th percentile we would recommend delivery at 37 weeks gestation.    Patient was counseled extensively regarding fetal movement will contact her provider immediately if she notices any decreased fetal movement.    Orders:  -     [facility]  Diagnostic Center; Future    2. Pregnancy, unspecified gestational age  -     [facility]  Diagnostic Center; Future         Follow Up  Return in about 2 weeks (around 4/10/2025).    I spent 20 minutes caring for the patient on the day of service. This included: obtaining or reviewing a separately obtained medical history, reviewing patient records, performing a medically appropriate exam and/or evaluation, counseling or educating the patient/family/caregiver, ordering medications, labs, and/or procedures and documenting such in the medical record. This does not include time spent on review and interpretation of other tests such as fetal  ultrasound or the performance of other procedures such as amniocentesis or CVS.      Douglas A. Milligan, MD  Maternal Fetal Medicine, [facility]    Diagnostic Center     2025

## 2025-03-31 ENCOUNTER — ROUTINE PRENATAL (OUTPATIENT)
Dept: OBSTETRICS AND GYNECOLOGY | Facility: CLINIC | Age: 30
End: 2025-03-31
Payer: COMMERCIAL

## 2025-03-31 VITALS — WEIGHT: 140.2 LBS | SYSTOLIC BLOOD PRESSURE: 104 MMHG | DIASTOLIC BLOOD PRESSURE: 68 MMHG | BODY MASS INDEX: 27.38 KG/M2

## 2025-03-31 DIAGNOSIS — Z3A.33 33 WEEKS GESTATION OF PREGNANCY: Primary | ICD-10-CM

## 2025-03-31 DIAGNOSIS — O36.5939 SGA (SMALL FOR GESTATIONAL AGE), FETAL, AFFECTING CARE OF MOTHER, ANTEPARTUM, THIRD TRIMESTER, OTHER FETUS: ICD-10-CM

## 2025-03-31 DIAGNOSIS — O36.5931 IUGR (INTRAUTERINE GROWTH RESTRICTION) AFFECTING CARE OF MOTHER, THIRD TRIMESTER, FETUS 1: ICD-10-CM

## 2025-03-31 LAB
GLUCOSE UR STRIP-MCNC: NEGATIVE MG/DL
PROT UR STRIP-MCNC: NEGATIVE MG/DL

## 2025-03-31 PROCEDURE — 59025 FETAL NON-STRESS TEST: CPT

## 2025-03-31 PROCEDURE — 99213 OFFICE O/P EST LOW 20 MIN: CPT

## 2025-03-31 NOTE — PROGRESS NOTES
OB FOLLOW UP  CC- Here for care of pregnancy        Li Stinson is a 30 y.o.  33w5d patient being seen today for her obstetrical follow up visit. Patient reports no complaints.    Her prenatal care is complicated by (and status) : see below.  Patient Active Problem List   Diagnosis    SGA (small for gestational age), fetal, affecting care of mother, antepartum, third trimester, other fetus    Pregnancy    IUGR (intrauterine growth restriction) affecting care of mother, third trimester, fetus 1       Ultrasound Today: No  Non Stress Test: Yes minutes >20  non-stress test: NST: Reactive w/ irregular contractions  indication: Fetal Growth Restriction (IUGR)    ROS -   Patient Denies: Loss of Fluid, Vaginal Spotting, Vision Changes, Headaches, Nausea , Vomiting , Contractions, Epigastric pain, and skin itching  Fetal Movement : normal  Other than what is documented in the HPI, all other systems reviewed and are negative.       The additional following portions of the patient's history were reviewed and updated as appropriate: allergies, current medications, past family history, past medical history, past social history, past surgical history, and problem list.    I have reviewed and agree with the HPI, ROS, and historical information as entered above. Paige Pritchett, APRN      /68   Wt 63.6 kg (140 lb 3.2 oz)   LMP 2024   BMI 27.38 kg/m²       EXAM:     Prenatal Vitals  BP: 104/68  Weight: 63.6 kg (140 lb 3.2 oz)   Fetal Heart Rate: NST               Urine Glucose Read-only: Negative  Urine Protein Read-only: Negative           Assessment and Plan    Problem List Items Addressed This Visit       IUGR (intrauterine growth restriction) affecting care of mother, third trimester, fetus 1    Pregnancy - Primary    Relevant Orders    POC Urinalysis Dipstick (Completed)    SGA (small for gestational age), fetal, affecting care of mother, antepartum, third trimester, other fetus    Overview    To PDC            Pregnancy at 33w5d  Fetal status reassuring.   Activity and Exercise discussed.  Fetal movement/PTL or Labor precautions  Patient is on Prenatal vitamins  Discussed/encouraged Flu vaccination  Discussed/encouraged social distancing/COVID19 precautions; encouraged vaccination when able  Reviewed Pre-eclampsia signs/symptoms  Return in about 3 days (around 4/3/2025) for ABIGAIL boone/Christiano NST.    Paige Pritchett, APRN  03/31/2025

## 2025-04-03 ENCOUNTER — ROUTINE PRENATAL (OUTPATIENT)
Dept: OBSTETRICS AND GYNECOLOGY | Facility: CLINIC | Age: 30
End: 2025-04-03
Payer: COMMERCIAL

## 2025-04-03 VITALS — DIASTOLIC BLOOD PRESSURE: 70 MMHG | WEIGHT: 139 LBS | SYSTOLIC BLOOD PRESSURE: 112 MMHG | BODY MASS INDEX: 27.15 KG/M2

## 2025-04-03 DIAGNOSIS — Z34.83 PRENATAL CARE, SUBSEQUENT PREGNANCY IN THIRD TRIMESTER: Primary | ICD-10-CM

## 2025-04-03 DIAGNOSIS — O36.5931 IUGR (INTRAUTERINE GROWTH RESTRICTION) AFFECTING CARE OF MOTHER, THIRD TRIMESTER, FETUS 1: ICD-10-CM

## 2025-04-03 DIAGNOSIS — Z3A.34 34 WEEKS GESTATION OF PREGNANCY: ICD-10-CM

## 2025-04-03 DIAGNOSIS — O36.5939 SGA (SMALL FOR GESTATIONAL AGE), FETAL, AFFECTING CARE OF MOTHER, ANTEPARTUM, THIRD TRIMESTER, OTHER FETUS: ICD-10-CM

## 2025-04-03 LAB
GLUCOSE UR STRIP-MCNC: NEGATIVE MG/DL
PROT UR STRIP-MCNC: NEGATIVE MG/DL

## 2025-04-03 NOTE — PROGRESS NOTES
OB FOLLOW UP  CC- Here for care of pregnancy        Li Stinson is a 30 y.o.  34w1d patient being seen today for her obstetrical follow up visit. Patient reports mild headaches, BH contractions, and occasional pressure. Patient states that her headaches are short-lived and do not require medication.      Her prenatal care is complicated by (and status) : see below.  Patient Active Problem List   Diagnosis    SGA (small for gestational age), fetal, affecting care of mother, antepartum, third trimester, other fetus    Pregnancy    IUGR (intrauterine growth restriction) affecting care of mother, third trimester, fetus 1       Ultrasound Today: No  Non Stress Test: Yes, 20 minutes   non-stress test: NST: Reactive  Indication: Fetal Growth Restriction (IUGR)    ROS -   Patient Denies: Loss of Fluid, Vaginal Spotting, Vision Changes, Nausea , Vomiting , Epigastric pain, and skin itching  Fetal Movement : normal  Other than what is documented in the HPI, all other systems reviewed and are negative.       The additional following portions of the patient's history were reviewed and updated as appropriate: allergies, current medications, and problem list.    I have reviewed and agree with the HPI, ROS, and historical information as entered above. Shantelle Quesada MD      /70   Wt 63 kg (139 lb)   LMP 2024   BMI 27.15 kg/m²       EXAM:     Prenatal Vitals  BP: 112/70  Weight: 63 kg (139 lb)     NST NOTE    Indiction :   IUGR  FHR:          Reactive, Cat 1, No decels  Contractions:    Irregular  Time Monitored:   > 20 minutes   Fetal Heart Rate: NST         [facility]  PAT NAME: LI STINSON  MED REC#: [id number]  BIRTH DA: [id number]  PAT GEND: F  ACCOUNT#: [id number]  PAT TYPE: O  EXAM ALLEN: [id number]  REF PHYS SHANTELLE QUESADA  ACCESSION [id number]    Comparison Studies  =================    There are no relevant prior studies to which this study is being  compared    Patient Status  ============    Outpatient    Indication  ========    IUGR    Maternal Assessment  ==================    Height 152 cm  Height (ft) 5 ft  Height (in) 0 in  Weight 63 kg  Weight (lb) 139 lb  BMI 27.27 kg/m²    Method  =======    Transabdominal ultrasound examination. View: Adequate view    Pregnancy  =========    Barba pregnancy. Number of fetuses: 1    Dating  ======    LMP on: 2024  GA by LMP 32 w + 2 d  ELVIS by LMP: 2025  GA by prior assessment 33 w + 1 d  ELVIS by prior assessment: 2025  Ultrasound examination on: 3/27/2025  GA by U/S based upon: AC, BPD, Femur, HC  GA by U/S 32 w + 1 d  ELVIS by U/S: 2025  Method of dating: Restore dating from previous exam  Previous dating: based on stated ELVIS, selected on 2025  Agreed ELVIS of previous datin2025  Assigned: based on stated ELVIS, selected on 2025  Assigned GA 33 w + 1 d  Assigned ELVIS: 2025  Pregnancy length 280 d    Fetal Biometry  ============    Standard  BPD 79.6 mm  32w 0d        14%        Hadlock    .6 mm  33w 2d        53%        Delfina    .9 mm  32w 4d        7%        Hadlock    Cerebellum tr 43.8 mm  34w 0d        55%        Hill    .1 mm  30w 4d        3%        Hadlock    Femur 64.6 mm  33w 2d        44%        Hadlock    Humerus 55.4 mm  32w 2d        36%        Delfina    HC / AC 1.11    EFW 1,837 g          25%        Marcellus    EFW (lb) 4 lb  EFW (oz) 1 oz  EFW by: Hadlock (BPD-HC-AC-FL)  Extended  Tibia 54.2 mm  32w 0d        33%        Delfina    Fibula 52.4 mm  32w 1d        36%        Delfina    Foot 67.8 mm          52%        Chitty    Radius 44.9 mm  31w 6d        42%        Delfina    Cav. septi pel. tr 6.4 mm   6.7 mm  CM 5.9 mm          13%        Nicolaides    Nasal bone 11.1 mm    Head / Face / Neck  Cephalic index 0.78          23%        Nicolaides    Extremities / Bony Struc  Rt Ulna 48.8 mm  31w 0d        4%        Delfina    FL / BPD 0.81    FL  / HC 0.22    FL / AC 0.24    Other Structures   bpm    General Evaluation  ================    Cardiac activity present.  bpm.  Fetal movements present.  Presentation cephalic.  Placenta posterior, Grade 1.  Umbilical cord Cord vessels: 3 vessel cord. Insertion site: placental insertion: normal.  Amniotic fluid Amount of AF: normal. MVP 4.9 cm. SARAH 17.1 cm. Q1 4.5 cm, Q2 4.9 cm, Q3 3.8 cm, Q4 3.9 cm.    Fetal Anatomy  ============    Cranium: Appears normal  Midline falx: Appears normal  Cavum septi pellucidi: Appears normal  Cerebellum: Appears normal  Cisterna magna: Appears normal  Head / Neck  Rt lateral ventricle: Appears normal  Lt lateral ventricle: Appears normal  Rt choroid plexus: Appears normal  Lt choroid plexus: Appears normal  Vermis: Appears normal  Neck: Appears normal  Lips: Appear normal  Profile: Appears normal  Nose: Appears normal  Face  Nose: Nasal bone present  Palate: Appears normal  Orbits: Appears normal  Lens: Normal  4-chamber view: Appears normal  RVOT view: Appears normal  LVOT view: Appears normal  Heart / Thorax  Aortic arch view: Appears normal  Ductal arch view: Appears normal  SVC: normal  IVC: normal  3-vessel view: Appears normal  3-vessel-trachea view: Appears normal  Rt lung: Appears normal  Lt lung: normal  Diaphragm: Appears normal  Diaphragm: Intact  Cord insertion: Appears normal  Stomach: Appears normal  Bladder: Appears normal  Abdomen  Rt kidney: normal  Lt kidney: normal  Liver: normal  Small bowel: normal  Large bowel: normal  Cervical spine: Appears normal  Thoracic spine: Appears normal  Lumbar spine: Appears normal  Sacral spine: Appears normal  Arms: Appears normal  Legs: Appears normal  Rt upper arm: Appears normal  Rt forearm: Appears normal  Rt hand: Appears normal  Lt upper arm: Appears normal  Lt forearm: Appears normal  Lt hand: Appears normal  Rt upper leg: Appears normal  Rt lower leg: Appears normal  Rt foot: Appears normal  Lt upper  leg: Appears normal  Lt lower leg: Appears normal  Lt foot: Appears normal  Gender: female  Wants to know gender: yes    Fetal Doppler  ===========    Arterial  Umbilical A PI 1.05          80%        Rosina    Umbilical A RI 0.66          76%        Rosina    Umbilical A PS 52.07 cm/s          67%        Ebbing    Umbilical A ED 19.27 cm/s  Umbilical A TAmax 33.75 cm/s          61%        Ebbing    Umbilical A MD 17.37 cm/s  Umbilical A S / D 2.92          68%        Rosina    Umbilical A  bpm    Biophysical Profile  ===============    2: Fetal breathing movements  2: Gross body movements  2: Fetal tone  2: Amniotic fluid volume  8/8 Biophysical profile score    Maternal Structures  ================    Uterus / Cervix  Cervix: Visualized  Approach: Transabdominal  Cervical length 48.6 mm  Ovaries / Tubes / Adnexa  Rt ovary: Visualized  Lt ovary: Visualized    Consultation / Office Visit  ====================    Office note to follow    Impression  =========    Size consistent with dates but with significant AC lag.    No fetal anomalies were identified.    Amniotic fluid volume is normal.    Umbilical artery S/D ratio is normal.    BPP 8/8 today.    Patient counseled re fetal movement.    Recommendation  ===============    Recommend twice weekly  testing at Dr. Alvarado' office.  Recommend increased rest and nutrition.    We recommend repeat evaluation for fetal growth and Doppler exam at [facility] in 2 weeks.  Follow up appointment scheduled here in 2 weeks.    Coding  ======    Description: 71864-70 Detailed Ultrasound  Description: 64287-79 BPP without NST  Description: 86977-70 Doppler Umbilical Artery    Sonographer: Meghana Patel RDMS  Physician: Doug Milligan, MD, FACOG    Electronically signed by: Doug Milligan, MD, FACOG at:  11:45         Urine Glucose Read-only: Negative  Urine Protein Read-only: Negative           Assessment and Plan    Problem List Items Addressed This Visit           Gravid and     Pregnancy       Other    SGA (small for gestational age), fetal, affecting care of mother, antepartum, third trimester, other fetus    Overview   To PDC         IUGR (intrauterine growth restriction) affecting care of mother, third trimester, fetus 1     Other Visit Diagnoses         Prenatal care, subsequent pregnancy in third trimester    -  Primary    Relevant Orders    POC Urinalysis Dipstick (Completed)            Pregnancy at 34w1d  Continue biweekly NST  Fetal status reassuring.   Activity and Exercise discussed.  Fetal movement/PTL or Labor precautions  Return in about 4 days (around 2025) for NST.    Kaila Alvarado MD  2025

## 2025-04-07 ENCOUNTER — ROUTINE PRENATAL (OUTPATIENT)
Dept: OBSTETRICS AND GYNECOLOGY | Facility: CLINIC | Age: 30
End: 2025-04-07
Payer: COMMERCIAL

## 2025-04-07 VITALS — SYSTOLIC BLOOD PRESSURE: 90 MMHG | BODY MASS INDEX: 27.3 KG/M2 | WEIGHT: 139.8 LBS | DIASTOLIC BLOOD PRESSURE: 50 MMHG

## 2025-04-07 DIAGNOSIS — O36.5931 IUGR (INTRAUTERINE GROWTH RESTRICTION) AFFECTING CARE OF MOTHER, THIRD TRIMESTER, FETUS 1: ICD-10-CM

## 2025-04-07 DIAGNOSIS — Z3A.34 34 WEEKS GESTATION OF PREGNANCY: ICD-10-CM

## 2025-04-07 DIAGNOSIS — R51.9 NONINTRACTABLE HEADACHE, UNSPECIFIED CHRONICITY PATTERN, UNSPECIFIED HEADACHE TYPE: ICD-10-CM

## 2025-04-07 DIAGNOSIS — Z34.93 PRENATAL CARE, THIRD TRIMESTER: Primary | ICD-10-CM

## 2025-04-07 DIAGNOSIS — O36.5939 SGA (SMALL FOR GESTATIONAL AGE), FETAL, AFFECTING CARE OF MOTHER, ANTEPARTUM, THIRD TRIMESTER, OTHER FETUS: ICD-10-CM

## 2025-04-07 DIAGNOSIS — R61 DIAPHORESIS: ICD-10-CM

## 2025-04-07 LAB
EXPIRATION DATE: 0
GLUCOSE UR STRIP-MCNC: NEGATIVE MG/DL
Lab: 0
PROT UR STRIP-MCNC: NEGATIVE MG/DL

## 2025-04-07 PROCEDURE — 99213 OFFICE O/P EST LOW 20 MIN: CPT

## 2025-04-07 PROCEDURE — 59025 FETAL NON-STRESS TEST: CPT

## 2025-04-07 NOTE — PROGRESS NOTES
OB FOLLOW UP  CC- Here for care of pregnancy        Li Stinson is a 30 y.o.  34w5d patient being seen today for her obstetrical follow up visit. Patient reports occasional headaches (denies vision changes or RUQ pain; resolve on their own), occasional constipation (increased fluids and apple juice helps), and daily [place] (with pressure; denies pain). Patient diaphoretic correction through NST; restarted after patient used the restroom.    Her prenatal care is complicated by (and status): see below.  Patient Active Problem List   Diagnosis    SGA (small for gestational age), fetal, affecting care of mother, antepartum, third trimester, other fetus    Pregnancy    IUGR (intrauterine growth restriction) affecting care of mother, third trimester, fetus 1       Flu Status: Declines  Ultrasound Today: No  Non Stress Test: Yes, 20+ minutes  non-stress test: NST: Reactive  indication:  SGA/IUGR    ROS -   Patient Denies: Loss of Fluid, Vaginal Spotting, Vision Changes, Nausea , Vomiting , Epigastric pain, and skin itching  Fetal Movement: normal  Other than what is documented in the HPI, all other systems reviewed and are negative.       The additional following portions of the patient's history were reviewed and updated as appropriate: allergies, current medications, past family history, past medical history, past social history, past surgical history, and problem list.    I have reviewed and agree with the HPI, ROS, and historical information as entered above. Jing Quan, APRN      BP 90/50   Wt 63.4 kg (139 lb 12.8 oz)   LMP 2024   BMI 27.30 kg/m²       EXAM:     Prenatal Vitals  BP: 90/50  Weight: 63.4 kg (139 lb 12.8 oz)   Fetal Heart Rate: NST+               Urine Glucose Read-only: Negative  Urine Protein Read-only: Negative           Assessment and Plan    Problem List Items Addressed This Visit          Gravid and     Pregnancy    Relevant Orders    POC Glucose, Urine,  Qualitative, Dipstick (Completed)    POC Protein, Urine, Qualitative, Dipstick (Completed)    Fetal Nonstress Test       Other    SGA (small for gestational age), fetal, affecting care of mother, antepartum, third trimester, other fetus    Overview   To PDC         Relevant Orders    Fetal Nonstress Test    IUGR (intrauterine growth restriction) affecting care of mother, third trimester, fetus 1    Overview   3/27/25 PD[facility]: HC   7%  & AC 3%  Repeat PD[facility] scan 4/10/25  2xweek NST  Increased rest/hydration            Relevant Orders    Fetal Nonstress Test     Other Visit Diagnoses         Prenatal care, third trimester    -  Primary    Relevant Orders    POC Glucose, Urine, Qualitative, Dipstick (Completed)    POC Protein, Urine, Qualitative, Dipstick (Completed)    Fetal Nonstress Test      Nonintractable headache, unspecified chronicity pattern, unspecified headache type        Relevant Orders    Comprehensive Metabolic Panel    CBC (No Diff)      Diaphoresis        Relevant Orders    Comprehensive Metabolic Panel    CBC (No Diff)            Pregnancy at 34w5d  Fetal status reassuring. 20 min NST 15 x 15 reactive, Cat 1, No contractions or decelerations noted.   Activity and Exercise discussed.  Fetal movement/PTL or Labor precautions  Lab(s) Ordered  U/S ordered at follow up  Patient is on Prenatal vitamins  Reviewed Pre-eclampsia signs/symptoms  Discussed discomforts of pregnancy: Encouraged stretching, hydration up to 80 ounces of water daily, wearing a belly band, compression socks, frequent high protein snacks, and heating pad prn to back only.   Dizziness precautions including slow position changes, stay hydrated, frequent high protein snacks. Monitor blood pressure to prevent hypotension. Do not drive if lightheaded or dizzy. Patient verbalized understanding  Follow up Thursday after PDC appt.    Jing Quan, APRN  04/07/2025

## 2025-04-08 LAB
ALBUMIN SERPL-MCNC: 3.5 G/DL (ref 3.5–5.2)
ALBUMIN/GLOB SERPL: 1.3 G/DL
ALP SERPL-CCNC: 115 U/L (ref 39–117)
ALT SERPL-CCNC: 7 U/L (ref 1–33)
AST SERPL-CCNC: 18 U/L (ref 1–32)
BILIRUB SERPL-MCNC: 0.2 MG/DL (ref 0–1.2)
BUN SERPL-MCNC: 6 MG/DL (ref 6–20)
BUN/CREAT SERPL: 10.3 (ref 7–25)
CALCIUM SERPL-MCNC: 9.1 MG/DL (ref 8.6–10.5)
CHLORIDE SERPL-SCNC: 103 MMOL/L (ref 98–107)
CO2 SERPL-SCNC: 22.3 MMOL/L (ref 22–29)
CREAT SERPL-MCNC: 0.58 MG/DL (ref 0.57–1)
EGFRCR SERPLBLD CKD-EPI 2021: 124.3 ML/MIN/1.73
ERYTHROCYTE [DISTWIDTH] IN BLOOD BY AUTOMATED COUNT: 13.6 % (ref 12.3–15.4)
GLOBULIN SER CALC-MCNC: 2.8 GM/DL
GLUCOSE SERPL-MCNC: 90 MG/DL (ref 65–99)
HCT VFR BLD AUTO: 30.9 % (ref 34–46.6)
HGB BLD-MCNC: 9.8 G/DL (ref 12–15.9)
MCH RBC QN AUTO: 27.8 PG (ref 26.6–33)
MCHC RBC AUTO-ENTMCNC: 31.7 G/DL (ref 31.5–35.7)
MCV RBC AUTO: 87.5 FL (ref 79–97)
PLATELET # BLD AUTO: 269 10*3/MM3 (ref 140–450)
POTASSIUM SERPL-SCNC: 4.3 MMOL/L (ref 3.5–5.2)
PROT SERPL-MCNC: 6.3 G/DL (ref 6–8.5)
RBC # BLD AUTO: 3.53 10*6/MM3 (ref 3.77–5.28)
SODIUM SERPL-SCNC: 136 MMOL/L (ref 136–145)
WBC # BLD AUTO: 9.43 10*3/MM3 (ref 3.4–10.8)

## 2025-04-10 ENCOUNTER — HOSPITAL ENCOUNTER (OUTPATIENT)
Dept: WOMENS IMAGING | Facility: HOSPITAL | Age: 30
Discharge: HOME | End: 2025-04-10
Admitting: OBSTETRICS & GYNECOLOGY
Payer: COMMERCIAL

## 2025-04-10 ENCOUNTER — PATIENT OUTREACH (OUTPATIENT)
Dept: LABOR AND DELIVERY | Facility: HOSPITAL | Age: 30
End: 2025-04-10
Payer: COMMERCIAL

## 2025-04-10 ENCOUNTER — ROUTINE PRENATAL (OUTPATIENT)
Dept: OBSTETRICS AND GYNECOLOGY | Facility: CLINIC | Age: 30
End: 2025-04-10
Payer: COMMERCIAL

## 2025-04-10 ENCOUNTER — OFFICE VISIT (OUTPATIENT)
Dept: OBSTETRICS AND GYNECOLOGY | Facility: HOSPITAL | Age: 30
End: 2025-04-10
Payer: COMMERCIAL

## 2025-04-10 VITALS — BODY MASS INDEX: 27.62 KG/M2 | DIASTOLIC BLOOD PRESSURE: 66 MMHG | SYSTOLIC BLOOD PRESSURE: 98 MMHG | WEIGHT: 141.4 LBS

## 2025-04-10 VITALS — BODY MASS INDEX: 27.73 KG/M2 | SYSTOLIC BLOOD PRESSURE: 122 MMHG | WEIGHT: 142 LBS | DIASTOLIC BLOOD PRESSURE: 82 MMHG

## 2025-04-10 DIAGNOSIS — O36.5931 IUGR (INTRAUTERINE GROWTH RESTRICTION) AFFECTING CARE OF MOTHER, THIRD TRIMESTER, FETUS 1: ICD-10-CM

## 2025-04-10 DIAGNOSIS — O36.5939 SGA (SMALL FOR GESTATIONAL AGE), FETAL, AFFECTING CARE OF MOTHER, ANTEPARTUM, THIRD TRIMESTER, OTHER FETUS: ICD-10-CM

## 2025-04-10 DIAGNOSIS — Z34.90 PREGNANCY, UNSPECIFIED GESTATIONAL AGE: ICD-10-CM

## 2025-04-10 DIAGNOSIS — O36.5931 IUGR (INTRAUTERINE GROWTH RESTRICTION) AFFECTING CARE OF MOTHER, THIRD TRIMESTER, FETUS 1: Primary | ICD-10-CM

## 2025-04-10 DIAGNOSIS — Z34.90 PRENATAL CARE, ANTEPARTUM, UNSPECIFIED GRAVIDITY: Primary | ICD-10-CM

## 2025-04-10 DIAGNOSIS — Z3A.35 35 WEEKS GESTATION OF PREGNANCY: ICD-10-CM

## 2025-04-10 LAB
GLUCOSE UR STRIP-MCNC: NEGATIVE MG/DL
PROT UR STRIP-MCNC: NEGATIVE MG/DL

## 2025-04-10 PROCEDURE — 76816 OB US FOLLOW-UP PER FETUS: CPT

## 2025-04-10 PROCEDURE — 76820 UMBILICAL ARTERY ECHO: CPT

## 2025-04-10 PROCEDURE — 76819 FETAL BIOPHYS PROFIL W/O NST: CPT

## 2025-04-10 RX ORDER — DOCUSATE SODIUM 100 MG/1
100 CAPSULE, LIQUID FILLED ORAL 2 TIMES DAILY
COMMUNITY

## 2025-04-10 NOTE — PROGRESS NOTES
Patient denies any leaking fluid or vaginal bleeding. States having irregular miguel paz contractions, relieved with position change.   NIPT negative.  Patient reports next follow-up appointment with Dr. Alvarado' office is today.

## 2025-04-10 NOTE — LETTER
"April 10, 2025     Kaila Alvarado MD  [facility] [address] [place] [id number]    Patient: Li Stinson   YOB: 1995   Date of Visit: 4/10/2025     Dear Kaila Alvarado MD:       Thank you for referring Li Stinson to me for evaluation. Below are the relevant portions of my assessment and plan of care.    If you have questions, please do not hesitate to call me. I look forward to following Li along with you.         Sincerely,        Douglas A. Milligan, MD        CC: No Recipients    Milligan, Douglas A, MD  04/10/25 1116  Sign when Signing Visit  Documentation of the ultrasound findings, images, and interpretations will be available in the patient's Viewpoint report which is located in the imaging tab in chart review.    Maternal/Fetal Medicine Follow Up  Note     Name: Li Stinson    : 1995     MRN: [id number]     Referring Provider: Kaila Alvarado MD    Chief Complaint  IUGR    Subjective     History of Present Illness:  Li Stinson is a 30 y.o.  35w1d who presents today for IUGR    ELVIS: Estimated Date of Delivery: 25     ROS:   As noted in HPI.     Objective     Vital Signs  /82   Wt 64.4 kg (142 lb)   LMP 2024   Estimated body mass index is 27.73 kg/m² as calculated from the following:    Height as of 8/3/16: 152.4 cm (60\").    Weight as of this encounter: 64.4 kg (142 lb).    Physical Exam    Ultrasound Impression:   See Viewpoint    Assessment and Plan     Li Stinson is a 30 y.o.  35w1d who presents today for IUGR    Diagnoses and all orders for this visit:    1. IUGR (intrauterine growth restriction) affecting care of mother, third trimester, fetus 1 (Primary)  Assessment & Plan:  Patient returns today for follow-up for pregnancy complicated by intrauterine growth restriction noted on scan in our office on .  Patient has been at increased rest and nutrition and does not smoke.  She notes no complications " since her last visit and notes good fetal movement.    Ultrasound today demonstrates a fetus with overall growth at 16th percentile.  Abdominal circumference is improved to approximately 8th percentile.  Amniotic fluid volume and umbilical artery Dopplers are entirely normal.  BPP is 8 out of 8 today.    With improving growth on the fetus we would continue increased rest and nutrition for the patient.  I would continue twice-weekly  testing at Dr. Gallardo office.  I believe that if no other complications arise the pregnancy can be allowed to continue to 38 to 39 weeks gestation.  We have not scheduled the patient back to see us but we would be happy to see her if necessary.    Patient was counseled extensively regarding fetal movement will contact her provider immediately if she notices any decreased fetal movement.      2. 35 weeks gestation of pregnancy         Follow Up  No follow-ups on file.    I spent 10 minutes caring for the patient on the day of service. This included: obtaining or reviewing a separately obtained medical history, reviewing patient records, performing a medically appropriate exam and/or evaluation, counseling or educating the patient/family/caregiver, ordering medications, labs, and/or procedures and documenting such in the medical record. This does not include time spent on review and interpretation of other tests such as fetal ultrasound or the performance of other procedures such as amniocentesis or CVS.      Douglas A. Milligan, MD  Maternal Fetal Medicine, [facility]     04/10/2025

## 2025-04-10 NOTE — PROGRESS NOTES
"Documentation of the ultrasound findings, images, and interpretations will be available in the patient's Viewpoint report which is located in the imaging tab in chart review.    Maternal/Fetal Medicine Follow Up  Note     Name: Li Stinson    : 1995     MRN: [id number]     Referring Provider: Kaila Alvarado MD    Chief Complaint  IUGR    Subjective     History of Present Illness:  Li Stinson is a 30 y.o.  35w1d who presents today for IUGR    ELVIS: Estimated Date of Delivery: 25     ROS:   As noted in HPI.     Objective     Vital Signs  /82   Wt 64.4 kg (142 lb)   LMP 2024   Estimated body mass index is 27.73 kg/m² as calculated from the following:    Height as of 8/3/16: 152.4 cm (60\").    Weight as of this encounter: 64.4 kg (142 lb).    Physical Exam    Ultrasound Impression:   See Viewpoint    Assessment and Plan     Li Stinson is a 30 y.o.  35w1d who presents today for IUGR    Diagnoses and all orders for this visit:    1. IUGR (intrauterine growth restriction) affecting care of mother, third trimester, fetus 1 (Primary)  Assessment & Plan:  Patient returns today for follow-up for pregnancy complicated by intrauterine growth restriction noted on scan in our office on .  Patient has been at increased rest and nutrition and does not smoke.  She notes no complications since her last visit and notes good fetal movement.    Ultrasound today demonstrates a fetus with overall growth at 16th percentile.  Abdominal circumference is improved to approximately 8th percentile.  Amniotic fluid volume and umbilical artery Dopplers are entirely normal.  BPP is 8 out of 8 today.    With improving growth on the fetus we would continue increased rest and nutrition for the patient.  I would continue twice-weekly  testing at Dr. Gallardo office.  I believe that if no other complications arise the pregnancy can be allowed to continue to 38 to 39 weeks gestation.  " We have not scheduled the patient back to see us but we would be happy to see her if necessary.    Patient was counseled extensively regarding fetal movement will contact her provider immediately if she notices any decreased fetal movement.      2. 35 weeks gestation of pregnancy         Follow Up  No follow-ups on file.    I spent 10 minutes caring for the patient on the day of service. This included: obtaining or reviewing a separately obtained medical history, reviewing patient records, performing a medically appropriate exam and/or evaluation, counseling or educating the patient/family/caregiver, ordering medications, labs, and/or procedures and documenting such in the medical record. This does not include time spent on review and interpretation of other tests such as fetal ultrasound or the performance of other procedures such as amniocentesis or CVS.      Douglas A. Milligan, MD  Maternal Fetal Medicine, [facility]     04/10/2025

## 2025-04-10 NOTE — ASSESSMENT & PLAN NOTE
Patient returns today for follow-up for pregnancy complicated by intrauterine growth restriction noted on scan in our office on .  Patient has been at increased rest and nutrition and does not smoke.  She notes no complications since her last visit and notes good fetal movement.    Ultrasound today demonstrates a fetus with overall growth at 16th percentile.  Abdominal circumference is improved to approximately 8th percentile.  Amniotic fluid volume and umbilical artery Dopplers are entirely normal.  BPP is 8 out of 8 today.    With improving growth on the fetus we would continue increased rest and nutrition for the patient.  I would continue twice-weekly  testing at Dr. Gallardo office.  I believe that if no other complications arise the pregnancy can be allowed to continue to 38 to 39 weeks gestation.  We have not scheduled the patient back to see us but we would be happy to see her if necessary.    Patient was counseled extensively regarding fetal movement will contact her provider immediately if she notices any decreased fetal movement.

## 2025-04-10 NOTE — PROGRESS NOTES
OB FOLLOW UP  CC- Here for care of pregnancy        Li Stinson is a 30 y.o.  35w1d patient being seen today for her obstetrical follow up visit. Patient reports that she has been cleared by PDC. Reports occasional miguel paz ctx.    Her prenatal care is complicated by (and status) :   Patient Active Problem List   Diagnosis    SGA (small for gestational age), fetal, affecting care of mother, antepartum, third trimester, other fetus    Pregnancy    IUGR (intrauterine growth restriction) affecting care of mother, third trimester, fetus 1         Ultrasound Today: Yes @ PDC BPP   Non Stress Test: No.    ROS -   Patient Denies: Loss of Fluid, Vaginal Spotting, Vision Changes, Headaches, Nausea , Vomiting , Contractions, Epigastric pain, and skin itching  Fetal Movement : normal  Other than what is documented in the HPI, all other systems reviewed and are negative.       The additional following portions of the patient's history were reviewed and updated as appropriate: allergies, current medications, past family history, past medical history, past social history, past surgical history, and problem list.    I have reviewed and agree with the HPI, ROS, and historical information as entered above. Jing Quan, APRN      BP /   Wt 64.1 kg (141 lb 6.4 oz)   LMP 2024   BMI 27.62 kg/m²       EXAM:     Prenatal Vitals  BP:   Weight: 64.1 kg (141 lb 6.4 oz)   Fetal Heart Rate: 135               Urine Glucose Read-only: Negative  Urine Protein Read-only: Negative           Assessment and Plan    Problem List Items Addressed This Visit          Other    SGA (small for gestational age), fetal, affecting care of mother, antepartum, third trimester, other fetus    Overview   To PDC         IUGR (intrauterine growth restriction) affecting care of mother, third trimester, fetus 1    Overview   3/27/25 [facility]: HC   7%  & AC 3%  Repeat [facility] scan 4/10/25  2xweek NST  Increased  rest/hydration    4/10/25 no longer IUGR per PDC             Other Visit Diagnoses         Prenatal care, antepartum, unspecified     -  Primary    Relevant Orders    POC Urinalysis Dipstick (Completed)            Pregnancy at 35w1d  Fetal status reassuring. Reviewed PDC us note-no longer IUGR ok to not follow up with PDC  Activity and Exercise discussed.  Fetal movement/PTL or Labor precautions  Patient is on Prenatal vitamins  GBS next visit  Follow up in one week ABIGAIL Quan, APRN  04/10/2025

## 2025-04-10 NOTE — OUTREACH NOTE
"[facility]  Check-In    Current Estimated Gestational Age: 35w1d      Questions/Answers      Flowsheet Row Responses   Best Method for Contacting Cell   Demographics Reviewed Yes   Currently Employed Yes   Able to keep appointments as scheduled Yes   Gender(s) and Name(s) Girl \"Jaylin\"   Baby Active/Feeling Fetal Movemen Yes   How are you presently feeling? Good   Are you having any of the following symptoms? Contractions   Questions regarding prenatal visits or tests to be ordered? No   New Diagnosis Other   Other New Diagnosis Significant AC growth lag   Education related to new diagnoses/home equipment No   Supplies ready for baby Car Seat, Clothing, Crib   Resource/Environmental Concerns None   Do you have any questions related to your care experience, your pregnancy, plans for delivery, any concerns, etc? No   Other Education Birth Plan, How to find a pediatrician, Meds to Beds, Mental Health Services              Feeling well and reports good fetal movement. Has had some miguel paz contractions. Going to [facility] today for US for growth check. States they are talking about inducing her at 37 weeks. Last US showed AC growth lag at 3%.    Discussed what to expect when she goes in to L&D including admission process, induction process, comfort measures, positioning, pain medication/Fentanyl, epidural placement, delivery, skin to skin, breastfeeding, post-delivery, and MB care. VU.    She is feeling ready for delivery. She states she has everything she will need for baby.    Updated resources provided via [employer] message. Contact information provided. Encouraged to call with questions, concerns, or for support. Will plan to see inpatient after delivery.    ANTONIO Dupree RN  Maternity Nurse Navigator    4/10/2025, 10:40 EDT          "

## 2025-04-17 ENCOUNTER — ROUTINE PRENATAL (OUTPATIENT)
Dept: OBSTETRICS AND GYNECOLOGY | Facility: CLINIC | Age: 30
End: 2025-04-17
Payer: COMMERCIAL

## 2025-04-17 VITALS — BODY MASS INDEX: 28.51 KG/M2 | WEIGHT: 146 LBS

## 2025-04-17 DIAGNOSIS — O36.5939 SGA (SMALL FOR GESTATIONAL AGE), FETAL, AFFECTING CARE OF MOTHER, ANTEPARTUM, THIRD TRIMESTER, OTHER FETUS: ICD-10-CM

## 2025-04-17 DIAGNOSIS — Z34.83 PRENATAL CARE, SUBSEQUENT PREGNANCY IN THIRD TRIMESTER: Primary | ICD-10-CM

## 2025-04-17 DIAGNOSIS — O36.5931 IUGR (INTRAUTERINE GROWTH RESTRICTION) AFFECTING CARE OF MOTHER, THIRD TRIMESTER, FETUS 1: ICD-10-CM

## 2025-04-17 DIAGNOSIS — Z3A.36 36 WEEKS GESTATION OF PREGNANCY: ICD-10-CM

## 2025-04-17 LAB
GLUCOSE UR STRIP-MCNC: NEGATIVE MG/DL
PROT UR STRIP-MCNC: NEGATIVE MG/DL

## 2025-04-17 NOTE — PROGRESS NOTES
OB FOLLOW UP  CC- Here for care of pregnancy        Li Stinson is a 30 y.o.  36w1d patient being seen today for her obstetrical follow up visit. Patient reports intermittent cramping.     Her prenatal care is complicated by (and status) : see below.  Patient Active Problem List   Diagnosis    SGA (small for gestational age), fetal, affecting care of mother, antepartum, third trimester, other fetus    Pregnancy    IUGR (intrauterine growth restriction) affecting care of mother, third trimester, fetus 1       GBS Status: Done Today. She is not allergic to PCN.    No Known Allergies     Her Delivery Plan is:  IOL 2025 at 2000     US today: No  Non Stress Test: Yes, 20 minutes   Non-stress test: NST: Reactive  Indication: Fetal Growth Restriction (IUGR)    ROS -   Patient Denies: Loss of Fluid, Vaginal Spotting, Vision Changes, Headaches, Nausea , Vomiting , Contractions, Epigastric pain, and skin itching  Fetal Movement : normal  Other than what is documented in the HPI, all other systems reviewed and are negative.       The additional following portions of the patient's history were reviewed and updated as appropriate: allergies, current medications, and problem list.    I have reviewed and agree with the HPI, ROS, and historical information as entered above. Kaila Alvarado MD        EXAM:     Prenatal Vitals  Weight: 66.2 kg (146 lb)   Fetal Heart Rate: NST          NST NOTE    Indiction :   IUGR  FHR:          Reactive, Cat 1, No decels  Contractions:    Irregular  Time Monitored:   > 20 minutes     Urine Glucose Read-only: Negative  Urine Protein Read-only: Negative           Assessment and Plan    Problem List Items Addressed This Visit          Gravid and     Pregnancy       Other    SGA (small for gestational age), fetal, affecting care of mother, antepartum, third trimester, other fetus    Overview   To PDC         IUGR (intrauterine growth restriction) affecting care of mother,  third trimester, fetus 1    Overview   3/27/25 [facility]: HC   7%  & AC 3%  Repeat PD[facility] scan 4/10/25  2xweek NST  Increased rest/hydration    4/10/25 no longer IUGR per PDC             Other Visit Diagnoses         Prenatal care, subsequent pregnancy in third trimester    -  Primary    Relevant Orders    POC Urinalysis Dipstick (Completed)    Group B Streptococcus Culture - Swab, Vaginal/Rectum            Pregnancy at 36w1d  Fetal status reassuring.   Reviewed Pre-eclampsia signs/symptoms  Delivery options reviewed with patient.  WIll change induction to 38 weeks.  Signs of labor reviewed  Kick counts reviewed  Activity and Exercise discussed.  Return in about 4 days (around 4/21/2025) for NST.    Kaila Alvarado MD  04/17/2025

## 2025-04-18 ENCOUNTER — LAB (OUTPATIENT)
Dept: LAB | Facility: HOSPITAL | Age: 30
End: 2025-04-18
Payer: COMMERCIAL

## 2025-04-18 DIAGNOSIS — Z34.83 PRENATAL CARE, SUBSEQUENT PREGNANCY IN THIRD TRIMESTER: ICD-10-CM

## 2025-04-18 PROCEDURE — 87081 CULTURE SCREEN ONLY: CPT

## 2025-04-21 ENCOUNTER — ROUTINE PRENATAL (OUTPATIENT)
Dept: OBSTETRICS AND GYNECOLOGY | Facility: CLINIC | Age: 30
End: 2025-04-21
Payer: COMMERCIAL

## 2025-04-21 VITALS — SYSTOLIC BLOOD PRESSURE: 118 MMHG | WEIGHT: 145.2 LBS | DIASTOLIC BLOOD PRESSURE: 66 MMHG | BODY MASS INDEX: 28.36 KG/M2

## 2025-04-21 DIAGNOSIS — O36.5931 IUGR (INTRAUTERINE GROWTH RESTRICTION) AFFECTING CARE OF MOTHER, THIRD TRIMESTER, FETUS 1: ICD-10-CM

## 2025-04-21 DIAGNOSIS — O36.5939 SGA (SMALL FOR GESTATIONAL AGE), FETAL, AFFECTING CARE OF MOTHER, ANTEPARTUM, THIRD TRIMESTER, OTHER FETUS: ICD-10-CM

## 2025-04-21 DIAGNOSIS — Z34.83 PRENATAL CARE, SUBSEQUENT PREGNANCY IN THIRD TRIMESTER: Primary | ICD-10-CM

## 2025-04-21 LAB
BACTERIA SPEC AEROBE CULT: NORMAL
GLUCOSE UR STRIP-MCNC: NEGATIVE MG/DL
PROT UR STRIP-MCNC: NEGATIVE MG/DL

## 2025-04-21 NOTE — PROGRESS NOTES
OB FOLLOW UP  CC- Here for care of pregnancy        Li Stinson is a 30 y.o.  36w5d patient being seen today for her obstetrical follow up visit. Patient reports lower abdominal pressure.     Her prenatal care is complicated by (and status) : see below.  Patient Active Problem List   Diagnosis    SGA (small for gestational age), fetal, affecting care of mother, antepartum, third trimester, other fetus    Pregnancy    IUGR (intrauterine growth restriction) affecting care of mother, third trimester, fetus 1       GBS Status: was already done and is negative.    No Known Allergies       Her Delivery Plan is:  IOL was rescheduled for 2025.     US today: no  Non Stress Test: No.    ROS -   Patient Denies: Loss of Fluid, Vaginal Spotting, Vision Changes, Headaches, Nausea , Vomiting , Contractions, Epigastric pain, and skin itching  Fetal Movement : normal  Other than what is documented in the HPI, all other systems reviewed and are negative.       The additional following portions of the patient's history were reviewed and updated as appropriate: allergies and current medications.    I have reviewed and agree with the HPI, ROS, and historical information as entered above. Jing Quan, APRN        EXAM:     Prenatal Vitals  Weight: 65.9 kg (145 lb 3.2 oz)   Fetal Heart Rate: NST                          Assessment and Plan    Problem List Items Addressed This Visit          Other    SGA (small for gestational age), fetal, affecting care of mother, antepartum, third trimester, other fetus    Overview   To PDC         IUGR (intrauterine growth restriction) affecting care of mother, third trimester, fetus 1    Overview   3/27/25 PD[facility]: HC   7%  & AC 3%  Repeat PD[facility] scan 4/10/25  2xweek NST  Increased rest/hydration    4/10/25 no longer IUGR per PDC             Other Visit Diagnoses         Prenatal care, subsequent pregnancy in third trimester    -  Primary    Relevant Orders    POC Urinalysis Dipstick             Pregnancy at 36w5d  Fetal status reassuring. 15 x 15 reactive NST for IUGR irregular contractions not painful to patient  Reviewed Pre-eclampsia signs/symptoms  Reviewed upcoming IOL with patient. Instructions given.  Delivery options reviewed with patient  Signs of labor reviewed  Kick counts reviewed  Activity and Exercise discussed.  Follow up Thursday for NST    Jing Quan, APRN  04/21/2025

## 2025-04-24 ENCOUNTER — ROUTINE PRENATAL (OUTPATIENT)
Dept: OBSTETRICS AND GYNECOLOGY | Facility: CLINIC | Age: 30
End: 2025-04-24
Payer: COMMERCIAL

## 2025-04-24 VITALS — BODY MASS INDEX: 28.12 KG/M2 | WEIGHT: 144 LBS | DIASTOLIC BLOOD PRESSURE: 72 MMHG | SYSTOLIC BLOOD PRESSURE: 112 MMHG

## 2025-04-24 DIAGNOSIS — Z3A.37 37 WEEKS GESTATION OF PREGNANCY: ICD-10-CM

## 2025-04-24 DIAGNOSIS — O36.5931 IUGR (INTRAUTERINE GROWTH RESTRICTION) AFFECTING CARE OF MOTHER, THIRD TRIMESTER, FETUS 1: ICD-10-CM

## 2025-04-24 DIAGNOSIS — O36.5939 SGA (SMALL FOR GESTATIONAL AGE), FETAL, AFFECTING CARE OF MOTHER, ANTEPARTUM, THIRD TRIMESTER, OTHER FETUS: ICD-10-CM

## 2025-04-24 DIAGNOSIS — Z34.83 PRENATAL CARE, SUBSEQUENT PREGNANCY IN THIRD TRIMESTER: Primary | ICD-10-CM

## 2025-04-24 LAB
GLUCOSE UR STRIP-MCNC: NEGATIVE MG/DL
PROT UR STRIP-MCNC: NEGATIVE MG/DL

## 2025-04-24 NOTE — PROGRESS NOTES
OB FOLLOW UP  CC- Here for care of pregnancy        Li Stinson is a 30 y.o.  37w1d patient being seen today for her obstetrical follow up visit. Patient reports irregular contractions.     Her prenatal care is complicated by (and status) : see below.  Patient Active Problem List   Diagnosis    SGA (small for gestational age), fetal, affecting care of mother, antepartum, third trimester, other fetus    Pregnancy    IUGR (intrauterine growth restriction) affecting care of mother, third trimester, fetus 1       GBS Status:   Group B Strep Culture   Date Value Ref Range Status   2025 No Group B Streptococcus Isolated at 2 days  Final         No Known Allergies     Her Delivery Plan is:  Scheduled for IOL 2025 at 0100.     US today: No  Non Stress Test: Yes, 20 minutes   non-stress test: NST: Reactive  indication: Fetal Growth Restriction (IUGR)    ROS -   Patient Denies: Loss of Fluid, Vaginal Spotting, Vision Changes, Headaches, Nausea , Vomiting , Epigastric pain, and skin itching  Fetal Movement : normal  Other than what is documented in the HPI, all other systems reviewed and are negative.       The additional following portions of the patient's history were reviewed and updated as appropriate: allergies, current medications, and problem list.    I have reviewed and agree with the HPI, ROS, and historical information as entered above. Kaila Alvarado MD        EXAM:     Prenatal Vitals  BP: 112/72  Weight: 65.3 kg (144 lb)   Fetal Heart Rate: NST              Urine Glucose Read-only: Negative  Urine Protein Read-only: Negative           Assessment and Plan    Problem List Items Addressed This Visit          Gravid and     Pregnancy       Other    SGA (small for gestational age), fetal, affecting care of mother, antepartum, third trimester, other fetus    Overview   To PD[facility]         IUGR (intrauterine growth restriction) affecting care of mother, third trimester, fetus 1     Overview   3/27/25 [facility]: HC   7%  & AC 3%  Repeat [facility] scan 4/10/25  2xweek NST  Increased rest/hydration    4/10/25 no longer IUGR per PDC             Other Visit Diagnoses         Prenatal care, subsequent pregnancy in third trimester    -  Primary    Relevant Orders    POC Urinalysis Dipstick (Completed)            Pregnancy at 37w1d  Fetal status reassuring.   Reviewed Pre-eclampsia signs/symptoms  Delivery options reviewed with patient  Signs of labor reviewed  Kick counts reviewed  Activity and Exercise discussed.  Return in about 5 days (around 4/29/2025) for NST.    Kaila Alvarado MD  04/24/2025

## 2025-04-29 ENCOUNTER — ROUTINE PRENATAL (OUTPATIENT)
Dept: OBSTETRICS AND GYNECOLOGY | Facility: CLINIC | Age: 30
End: 2025-04-29
Payer: COMMERCIAL

## 2025-04-29 VITALS — WEIGHT: 144.8 LBS | BODY MASS INDEX: 28.28 KG/M2 | DIASTOLIC BLOOD PRESSURE: 70 MMHG | SYSTOLIC BLOOD PRESSURE: 102 MMHG

## 2025-04-29 DIAGNOSIS — O36.5939 SGA (SMALL FOR GESTATIONAL AGE), FETAL, AFFECTING CARE OF MOTHER, ANTEPARTUM, THIRD TRIMESTER, OTHER FETUS: ICD-10-CM

## 2025-04-29 DIAGNOSIS — Z3A.37 37 WEEKS GESTATION OF PREGNANCY: Primary | ICD-10-CM

## 2025-04-29 DIAGNOSIS — O36.5931 IUGR (INTRAUTERINE GROWTH RESTRICTION) AFFECTING CARE OF MOTHER, THIRD TRIMESTER, FETUS 1: ICD-10-CM

## 2025-04-29 LAB
GLUCOSE UR STRIP-MCNC: NEGATIVE MG/DL
PROT UR STRIP-MCNC: NEGATIVE MG/DL

## 2025-04-29 NOTE — PROGRESS NOTES
OB FOLLOW UP  CC- Here for care of pregnancy        Li Stinson is a 30 y.o.  37w6d patient being seen today for her obstetrical follow up visit. Patient reports irregular contractions and vaginal pressure.     Her prenatal care is complicated by (and status) : see below.  Patient Active Problem List   Diagnosis    SGA (small for gestational age), fetal, affecting care of mother, antepartum, third trimester, other fetus    Pregnancy    IUGR (intrauterine growth restriction) affecting care of mother, third trimester, fetus 1       GBS Status:   Group B Strep Culture   Date Value Ref Range Status   2025 No Group B Streptococcus Isolated at 2 days  Final         No Known Allergies     Her Delivery Plan is:  IOL 2025 at 0100     US today: No  Non Stress Test: Yes, 20 minutes   Non-stress test: NST: Reactive  Indication: Fetal Growth Restriction (IUGR)    ROS -   Patient Denies: Loss of Fluid, Vaginal Spotting, Vision Changes, Headaches, Nausea , Vomiting , Epigastric pain, and skin itching  Fetal Movement : normal  Other than what is documented in the HPI, all other systems reviewed and are negative.       The additional following portions of the patient's history were reviewed and updated as appropriate: allergies, current medications, and problem list.    I have reviewed and agree with the HPI, ROS, and historical information as entered above. Kaila Alvarado MD        EXAM:     Prenatal Vitals  BP: 102/70  Weight: 65.7 kg (144 lb 12.8 oz)   Fetal Heart Rate: NST       Dilation/Effacement/Station  Dilation: 1  Effacement (%): 50      Urine Glucose Read-only: Negative  Urine Protein Read-only: Negative    NST NOTE    Indiction :   SGA  FHR:          Reactive, Cat 1, No decels  Contractions:    Irregular  Time Monitored:   > 20 minutes        Assessment and Plan    Problem List Items Addressed This Visit          Gravid and     Pregnancy - Primary    Relevant Orders    POC Urinalysis  Dipstick (Completed)       Other    SGA (small for gestational age), fetal, affecting care of mother, antepartum, third trimester, other fetus    Overview   To PDC         Relevant Orders    POC Urinalysis Dipstick (Completed)    IUGR (intrauterine growth restriction) affecting care of mother, third trimester, fetus 1    Overview   3/27/25 PDC: HC   7%  & AC 3%  Repeat PDC scan 4/10/25  2xweek NST  Increased rest/hydration    4/10/25 - AC 9%, recommended delivery 38-39 weeks    IOL 05/01/2025 at 0100            Relevant Orders    POC Urinalysis Dipstick (Completed)       Pregnancy at 37w6d  Fetal status reassuring.   Reviewed Pre-eclampsia signs/symptoms  Delivery options reviewed with patient and induction process  Signs of labor reviewed  Kick counts reviewed  Activity and Exercise discussed.  Return in about 6 weeks (around 6/10/2025) for Postpartum follow-up.    Kaila Alvarado MD  04/29/2025

## 2025-04-30 ENCOUNTER — PREP FOR SURGERY (OUTPATIENT)
Dept: OTHER | Facility: HOSPITAL | Age: 30
End: 2025-04-30
Payer: COMMERCIAL

## 2025-04-30 RX ORDER — MISOPROSTOL 200 UG/1
800 TABLET ORAL AS NEEDED
Status: CANCELLED | OUTPATIENT
Start: 2025-04-30

## 2025-04-30 RX ORDER — OXYTOCIN/0.9 % SODIUM CHLORIDE 30/500 ML
2-20 PLASTIC BAG, INJECTION (ML) INTRAVENOUS
Status: CANCELLED | OUTPATIENT
Start: 2025-04-30

## 2025-04-30 RX ORDER — LIDOCAINE HYDROCHLORIDE 10 MG/ML
0.5 INJECTION, SOLUTION EPIDURAL; INFILTRATION; INTRACAUDAL; PERINEURAL ONCE AS NEEDED
Status: CANCELLED | OUTPATIENT
Start: 2025-04-30

## 2025-04-30 RX ORDER — OXYTOCIN/0.9 % SODIUM CHLORIDE 30/500 ML
999 PLASTIC BAG, INJECTION (ML) INTRAVENOUS ONCE
Status: CANCELLED | OUTPATIENT
Start: 2025-04-30 | End: 2025-04-30

## 2025-04-30 RX ORDER — ONDANSETRON 2 MG/ML
4 INJECTION INTRAMUSCULAR; INTRAVENOUS EVERY 6 HOURS PRN
Status: CANCELLED | OUTPATIENT
Start: 2025-04-30

## 2025-04-30 RX ORDER — SODIUM CHLORIDE 9 MG/ML
40 INJECTION, SOLUTION INTRAVENOUS AS NEEDED
Status: CANCELLED | OUTPATIENT
Start: 2025-04-30

## 2025-04-30 RX ORDER — METHYLERGONOVINE MALEATE 0.2 MG/ML
200 INJECTION INTRAVENOUS ONCE AS NEEDED
Status: CANCELLED | OUTPATIENT
Start: 2025-04-30

## 2025-04-30 RX ORDER — ACETAMINOPHEN 325 MG/1
650 TABLET ORAL EVERY 4 HOURS PRN
Status: CANCELLED | OUTPATIENT
Start: 2025-04-30

## 2025-04-30 RX ORDER — SODIUM CHLORIDE, SODIUM LACTATE, POTASSIUM CHLORIDE, CALCIUM CHLORIDE 600; 310; 30; 20 MG/100ML; MG/100ML; MG/100ML; MG/100ML
125 INJECTION, SOLUTION INTRAVENOUS CONTINUOUS
Status: CANCELLED | OUTPATIENT
Start: 2025-04-30 | End: 2025-05-01

## 2025-04-30 RX ORDER — OXYTOCIN/0.9 % SODIUM CHLORIDE 30/500 ML
250 PLASTIC BAG, INJECTION (ML) INTRAVENOUS CONTINUOUS
Status: CANCELLED | OUTPATIENT
Start: 2025-04-30 | End: 2025-04-30

## 2025-04-30 RX ORDER — MAGNESIUM CARB/ALUMINUM HYDROX 105-160MG
30 TABLET,CHEWABLE ORAL ONCE
Status: CANCELLED | OUTPATIENT
Start: 2025-04-30 | End: 2025-04-30

## 2025-04-30 RX ORDER — HYDROCODONE BITARTRATE AND ACETAMINOPHEN 10; 325 MG/1; MG/1
1 TABLET ORAL EVERY 4 HOURS PRN
Refills: 0 | Status: CANCELLED | OUTPATIENT
Start: 2025-04-30 | End: 2025-05-10

## 2025-04-30 RX ORDER — IBUPROFEN 600 MG/1
600 TABLET, FILM COATED ORAL EVERY 6 HOURS PRN
Status: CANCELLED | OUTPATIENT
Start: 2025-04-30

## 2025-04-30 RX ORDER — CARBOPROST TROMETHAMINE 250 UG/ML
250 INJECTION, SOLUTION INTRAMUSCULAR AS NEEDED
Status: CANCELLED | OUTPATIENT
Start: 2025-04-30

## 2025-04-30 RX ORDER — SODIUM CHLORIDE 0.9 % (FLUSH) 0.9 %
10 SYRINGE (ML) INJECTION EVERY 12 HOURS SCHEDULED
Status: CANCELLED | OUTPATIENT
Start: 2025-04-30

## 2025-04-30 RX ORDER — SODIUM CHLORIDE 0.9 % (FLUSH) 0.9 %
10 SYRINGE (ML) INJECTION AS NEEDED
Status: CANCELLED | OUTPATIENT
Start: 2025-04-30

## 2025-04-30 RX ORDER — ONDANSETRON 4 MG/1
4 TABLET, ORALLY DISINTEGRATING ORAL EVERY 6 HOURS PRN
Status: CANCELLED | OUTPATIENT
Start: 2025-04-30

## 2025-05-01 ENCOUNTER — HOSPITAL ENCOUNTER (INPATIENT)
Facility: HOSPITAL | Age: 30
LOS: 2 days | Discharge: HOME | End: 2025-05-03
Attending: OBSTETRICS & GYNECOLOGY | Admitting: OBSTETRICS & GYNECOLOGY
Payer: COMMERCIAL

## 2025-05-01 ENCOUNTER — HOSPITAL ENCOUNTER (OUTPATIENT)
Dept: LABOR AND DELIVERY | Facility: HOSPITAL | Age: 30
Discharge: HOME | End: 2025-05-01
Payer: COMMERCIAL

## 2025-05-01 ENCOUNTER — ANESTHESIA (OUTPATIENT)
Dept: LABOR AND DELIVERY | Facility: HOSPITAL | Age: 30
End: 2025-05-01
Payer: COMMERCIAL

## 2025-05-01 ENCOUNTER — ANESTHESIA EVENT (OUTPATIENT)
Dept: LABOR AND DELIVERY | Facility: HOSPITAL | Age: 30
End: 2025-05-01
Payer: COMMERCIAL

## 2025-05-01 PROBLEM — Z34.90 PREGNANT: Status: RESOLVED | Noted: 2025-05-01 | Resolved: 2025-05-01

## 2025-05-01 PROBLEM — O36.5939 SGA (SMALL FOR GESTATIONAL AGE), FETAL, AFFECTING CARE OF MOTHER, ANTEPARTUM, THIRD TRIMESTER, OTHER FETUS: Status: RESOLVED | Noted: 2025-02-27 | Resolved: 2025-05-01

## 2025-05-01 PROBLEM — Z34.90 PREGNANT: Status: ACTIVE | Noted: 2025-05-01

## 2025-05-01 PROBLEM — Z34.90 PREGNANCY: Status: RESOLVED | Noted: 2025-03-27 | Resolved: 2025-05-01

## 2025-05-01 PROBLEM — O36.5931 IUGR (INTRAUTERINE GROWTH RESTRICTION) AFFECTING CARE OF MOTHER, THIRD TRIMESTER, FETUS 1: Status: RESOLVED | Noted: 2025-03-27 | Resolved: 2025-05-01

## 2025-05-01 LAB
ABO GROUP BLD: NORMAL
ABO GROUP BLD: NORMAL
BLD GP AB SCN SERPL QL: NEGATIVE
DEPRECATED RDW RBC AUTO: 46.6 FL (ref 37–54)
ERYTHROCYTE [DISTWIDTH] IN BLOOD BY AUTOMATED COUNT: 14.9 % (ref 12.3–15.4)
HCT VFR BLD AUTO: 30.6 % (ref 34–46.6)
HGB BLD-MCNC: 9.5 G/DL (ref 12–15.9)
MCH RBC QN AUTO: 26.7 PG (ref 26.6–33)
MCHC RBC AUTO-ENTMCNC: 31 G/DL (ref 31.5–35.7)
MCV RBC AUTO: 86 FL (ref 79–97)
PLATELET # BLD AUTO: 263 10*3/MM3 (ref 140–450)
PMV BLD AUTO: 10.7 FL (ref 6–12)
RBC # BLD AUTO: 3.56 10*6/MM3 (ref 3.77–5.28)
RH BLD: POSITIVE
RH BLD: POSITIVE
T&S EXPIRATION DATE: NORMAL
TREPONEMA PALLIDUM IGG+IGM AB [PRESENCE] IN SERUM OR PLASMA BY IMMUNOASSAY: NORMAL
WBC NRBC COR # BLD AUTO: 9.13 10*3/MM3 (ref 3.4–10.8)

## 2025-05-01 PROCEDURE — 51703 INSERT BLADDER CATH COMPLEX: CPT

## 2025-05-01 PROCEDURE — 25010000002 FENTANYL CITRATE (PF) 50 MCG/ML SOLUTION: Performed by: NURSE ANESTHETIST, CERTIFIED REGISTERED

## 2025-05-01 PROCEDURE — 86901 BLOOD TYPING SEROLOGIC RH(D): CPT

## 2025-05-01 PROCEDURE — 25010000002 ROPIVACAINE PER 1 MG: Performed by: NURSE ANESTHETIST, CERTIFIED REGISTERED

## 2025-05-01 PROCEDURE — 86850 RBC ANTIBODY SCREEN: CPT | Performed by: OBSTETRICS & GYNECOLOGY

## 2025-05-01 PROCEDURE — 59025 FETAL NON-STRESS TEST: CPT

## 2025-05-01 PROCEDURE — 25010000002 BUTORPHANOL PER 1 MG: Performed by: OBSTETRICS & GYNECOLOGY

## 2025-05-01 PROCEDURE — 59410 OBSTETRICAL CARE: CPT | Performed by: OBSTETRICS & GYNECOLOGY

## 2025-05-01 PROCEDURE — C1755 CATHETER, INTRASPINAL: HCPCS | Performed by: ANESTHESIOLOGY

## 2025-05-01 PROCEDURE — 85027 COMPLETE CBC AUTOMATED: CPT | Performed by: OBSTETRICS & GYNECOLOGY

## 2025-05-01 PROCEDURE — 86900 BLOOD TYPING SEROLOGIC ABO: CPT | Performed by: OBSTETRICS & GYNECOLOGY

## 2025-05-01 PROCEDURE — 86901 BLOOD TYPING SEROLOGIC RH(D): CPT | Performed by: OBSTETRICS & GYNECOLOGY

## 2025-05-01 PROCEDURE — 86900 BLOOD TYPING SEROLOGIC ABO: CPT

## 2025-05-01 PROCEDURE — 25010000002 LIDOCAINE-EPINEPHRINE (PF) 1.5 %-1:200000 SOLUTION: Performed by: NURSE ANESTHETIST, CERTIFIED REGISTERED

## 2025-05-01 PROCEDURE — 25810000003 LACTATED RINGERS PER 1000 ML: Performed by: OBSTETRICS & GYNECOLOGY

## 2025-05-01 PROCEDURE — S0260 H&P FOR SURGERY: HCPCS | Performed by: OBSTETRICS & GYNECOLOGY

## 2025-05-01 PROCEDURE — 25010000002 ONDANSETRON PER 1 MG: Performed by: OBSTETRICS & GYNECOLOGY

## 2025-05-01 PROCEDURE — 0U7C7DJ DILATION OF CERVIX WITH INTRALUM DEV, TEMP, VIA OPENING: ICD-10-PCS | Performed by: OBSTETRICS & GYNECOLOGY

## 2025-05-01 PROCEDURE — C1755 CATHETER, INTRASPINAL: HCPCS

## 2025-05-01 PROCEDURE — 86780 TREPONEMA PALLIDUM: CPT | Performed by: OBSTETRICS & GYNECOLOGY

## 2025-05-01 RX ORDER — METOCLOPRAMIDE HYDROCHLORIDE 5 MG/ML
10 INJECTION INTRAMUSCULAR; INTRAVENOUS ONCE AS NEEDED
Status: DISCONTINUED | OUTPATIENT
Start: 2025-05-01 | End: 2025-05-01 | Stop reason: HOSPADM

## 2025-05-01 RX ORDER — SODIUM CHLORIDE 0.9 % (FLUSH) 0.9 %
1-10 SYRINGE (ML) INJECTION AS NEEDED
Status: DISCONTINUED | OUTPATIENT
Start: 2025-05-01 | End: 2025-05-03 | Stop reason: HOSPADM

## 2025-05-01 RX ORDER — IBUPROFEN 600 MG/1
600 TABLET, FILM COATED ORAL EVERY 6 HOURS PRN
Status: DISCONTINUED | OUTPATIENT
Start: 2025-05-01 | End: 2025-05-01 | Stop reason: HOSPADM

## 2025-05-01 RX ORDER — IBUPROFEN 600 MG/1
600 TABLET, FILM COATED ORAL EVERY 6 HOURS PRN
Status: DISCONTINUED | OUTPATIENT
Start: 2025-05-01 | End: 2025-05-03 | Stop reason: HOSPADM

## 2025-05-01 RX ORDER — ONDANSETRON 2 MG/ML
4 INJECTION INTRAMUSCULAR; INTRAVENOUS EVERY 6 HOURS PRN
Status: DISCONTINUED | OUTPATIENT
Start: 2025-05-01 | End: 2025-05-01 | Stop reason: HOSPADM

## 2025-05-01 RX ORDER — EPHEDRINE SULFATE/0.9% NACL/PF 25 MG/5 ML
10 SYRINGE (ML) INTRAVENOUS
Status: DISCONTINUED | OUTPATIENT
Start: 2025-05-01 | End: 2025-05-01 | Stop reason: HOSPADM

## 2025-05-01 RX ORDER — OXYTOCIN/0.9 % SODIUM CHLORIDE 30/500 ML
999 PLASTIC BAG, INJECTION (ML) INTRAVENOUS ONCE
Status: DISCONTINUED | OUTPATIENT
Start: 2025-05-01 | End: 2025-05-01 | Stop reason: HOSPADM

## 2025-05-01 RX ORDER — FAMOTIDINE 10 MG/ML
20 INJECTION, SOLUTION INTRAVENOUS ONCE AS NEEDED
Status: DISCONTINUED | OUTPATIENT
Start: 2025-05-01 | End: 2025-05-01 | Stop reason: HOSPADM

## 2025-05-01 RX ORDER — MAGNESIUM CARB/ALUMINUM HYDROX 105-160MG
30 TABLET,CHEWABLE ORAL ONCE
Status: DISCONTINUED | OUTPATIENT
Start: 2025-05-01 | End: 2025-05-01 | Stop reason: HOSPADM

## 2025-05-01 RX ORDER — ACETAMINOPHEN 325 MG/1
650 TABLET ORAL EVERY 4 HOURS PRN
Status: DISCONTINUED | OUTPATIENT
Start: 2025-05-01 | End: 2025-05-01 | Stop reason: HOSPADM

## 2025-05-01 RX ORDER — OXYTOCIN/0.9 % SODIUM CHLORIDE 30/500 ML
125 PLASTIC BAG, INJECTION (ML) INTRAVENOUS ONCE AS NEEDED
Status: DISCONTINUED | OUTPATIENT
Start: 2025-05-01 | End: 2025-05-03 | Stop reason: HOSPADM

## 2025-05-01 RX ORDER — HYDROCODONE BITARTRATE AND ACETAMINOPHEN 10; 325 MG/1; MG/1
1 TABLET ORAL EVERY 4 HOURS PRN
Status: DISCONTINUED | OUTPATIENT
Start: 2025-05-01 | End: 2025-05-01 | Stop reason: HOSPADM

## 2025-05-01 RX ORDER — FENTANYL CITRATE 50 UG/ML
INJECTION, SOLUTION INTRAMUSCULAR; INTRAVENOUS AS NEEDED
Status: DISCONTINUED | OUTPATIENT
Start: 2025-05-01 | End: 2025-05-01 | Stop reason: SURG

## 2025-05-01 RX ORDER — ONDANSETRON 4 MG/1
4 TABLET, ORALLY DISINTEGRATING ORAL EVERY 6 HOURS PRN
Status: DISCONTINUED | OUTPATIENT
Start: 2025-05-01 | End: 2025-05-01 | Stop reason: HOSPADM

## 2025-05-01 RX ORDER — EPHEDRINE SULFATE 5 MG/ML
INJECTION INTRAVENOUS
Status: COMPLETED
Start: 2025-05-01 | End: 2025-05-01

## 2025-05-01 RX ORDER — BUTORPHANOL TARTRATE 2 MG/ML
2 INJECTION, SOLUTION INTRAMUSCULAR; INTRAVENOUS EVERY 4 HOURS PRN
Status: DISCONTINUED | OUTPATIENT
Start: 2025-05-01 | End: 2025-05-01

## 2025-05-01 RX ORDER — DOCUSATE SODIUM 100 MG/1
100 CAPSULE, LIQUID FILLED ORAL 2 TIMES DAILY
Status: DISCONTINUED | OUTPATIENT
Start: 2025-05-01 | End: 2025-05-03 | Stop reason: HOSPADM

## 2025-05-01 RX ORDER — HYDROCORTISONE 25 MG/G
1 CREAM TOPICAL AS NEEDED
Status: DISCONTINUED | OUTPATIENT
Start: 2025-05-01 | End: 2025-05-03 | Stop reason: HOSPADM

## 2025-05-01 RX ORDER — BISACODYL 10 MG
10 SUPPOSITORY, RECTAL RECTAL DAILY PRN
Status: DISCONTINUED | OUTPATIENT
Start: 2025-05-02 | End: 2025-05-03 | Stop reason: HOSPADM

## 2025-05-01 RX ORDER — LIDOCAINE HYDROCHLORIDE AND EPINEPHRINE 15; 5 MG/ML; UG/ML
INJECTION, SOLUTION EPIDURAL AS NEEDED
Status: DISCONTINUED | OUTPATIENT
Start: 2025-05-01 | End: 2025-05-01 | Stop reason: SURG

## 2025-05-01 RX ORDER — SODIUM CHLORIDE 0.9 % (FLUSH) 0.9 %
10 SYRINGE (ML) INJECTION AS NEEDED
Status: DISCONTINUED | OUTPATIENT
Start: 2025-05-01 | End: 2025-05-01 | Stop reason: HOSPADM

## 2025-05-01 RX ORDER — CITRIC ACID/SODIUM CITRATE 334-500MG
30 SOLUTION, ORAL ORAL ONCE
Status: DISCONTINUED | OUTPATIENT
Start: 2025-05-01 | End: 2025-05-01 | Stop reason: HOSPADM

## 2025-05-01 RX ORDER — METHYLERGONOVINE MALEATE 0.2 MG/ML
200 INJECTION INTRAVENOUS ONCE AS NEEDED
Status: DISCONTINUED | OUTPATIENT
Start: 2025-05-01 | End: 2025-05-01 | Stop reason: HOSPADM

## 2025-05-01 RX ORDER — DIPHENHYDRAMINE HCL 25 MG
25 CAPSULE ORAL NIGHTLY PRN
Status: DISCONTINUED | OUTPATIENT
Start: 2025-05-01 | End: 2025-05-03 | Stop reason: HOSPADM

## 2025-05-01 RX ORDER — ACETAMINOPHEN 325 MG/1
650 TABLET ORAL EVERY 4 HOURS PRN
Status: DISCONTINUED | OUTPATIENT
Start: 2025-05-01 | End: 2025-05-01 | Stop reason: SDUPTHER

## 2025-05-01 RX ORDER — OXYTOCIN/0.9 % SODIUM CHLORIDE 30/500 ML
2-20 PLASTIC BAG, INJECTION (ML) INTRAVENOUS
Status: DISCONTINUED | OUTPATIENT
Start: 2025-05-01 | End: 2025-05-01 | Stop reason: HOSPADM

## 2025-05-01 RX ORDER — DIPHENHYDRAMINE HYDROCHLORIDE 50 MG/ML
12.5 INJECTION, SOLUTION INTRAMUSCULAR; INTRAVENOUS EVERY 8 HOURS PRN
Status: DISCONTINUED | OUTPATIENT
Start: 2025-05-01 | End: 2025-05-01 | Stop reason: HOSPADM

## 2025-05-01 RX ORDER — SODIUM CHLORIDE 0.9 % (FLUSH) 0.9 %
10 SYRINGE (ML) INJECTION EVERY 12 HOURS SCHEDULED
Status: DISCONTINUED | OUTPATIENT
Start: 2025-05-01 | End: 2025-05-01 | Stop reason: HOSPADM

## 2025-05-01 RX ORDER — OXYTOCIN/0.9 % SODIUM CHLORIDE 30/500 ML
250 PLASTIC BAG, INJECTION (ML) INTRAVENOUS CONTINUOUS
Status: ACTIVE | OUTPATIENT
Start: 2025-05-01 | End: 2025-05-01

## 2025-05-01 RX ORDER — ONDANSETRON 2 MG/ML
4 INJECTION INTRAMUSCULAR; INTRAVENOUS EVERY 6 HOURS PRN
Status: DISCONTINUED | OUTPATIENT
Start: 2025-05-01 | End: 2025-05-01 | Stop reason: SDUPTHER

## 2025-05-01 RX ORDER — HYDROCODONE BITARTRATE AND ACETAMINOPHEN 5; 325 MG/1; MG/1
1 TABLET ORAL EVERY 4 HOURS PRN
Status: DISCONTINUED | OUTPATIENT
Start: 2025-05-01 | End: 2025-05-03 | Stop reason: HOSPADM

## 2025-05-01 RX ORDER — HYDROCODONE BITARTRATE AND ACETAMINOPHEN 10; 325 MG/1; MG/1
1 TABLET ORAL EVERY 4 HOURS PRN
Status: DISCONTINUED | OUTPATIENT
Start: 2025-05-01 | End: 2025-05-03 | Stop reason: HOSPADM

## 2025-05-01 RX ORDER — SODIUM CHLORIDE 9 MG/ML
40 INJECTION, SOLUTION INTRAVENOUS AS NEEDED
Status: DISCONTINUED | OUTPATIENT
Start: 2025-05-01 | End: 2025-05-01 | Stop reason: HOSPADM

## 2025-05-01 RX ORDER — CARBOPROST TROMETHAMINE 250 UG/ML
250 INJECTION, SOLUTION INTRAMUSCULAR AS NEEDED
Status: DISCONTINUED | OUTPATIENT
Start: 2025-05-01 | End: 2025-05-01 | Stop reason: HOSPADM

## 2025-05-01 RX ORDER — MISOPROSTOL 200 UG/1
800 TABLET ORAL AS NEEDED
Status: DISCONTINUED | OUTPATIENT
Start: 2025-05-01 | End: 2025-05-01 | Stop reason: HOSPADM

## 2025-05-01 RX ORDER — ACETAMINOPHEN 325 MG/1
650 TABLET ORAL EVERY 6 HOURS PRN
Status: DISCONTINUED | OUTPATIENT
Start: 2025-05-01 | End: 2025-05-03 | Stop reason: HOSPADM

## 2025-05-01 RX ORDER — SODIUM CHLORIDE, SODIUM LACTATE, POTASSIUM CHLORIDE, CALCIUM CHLORIDE 600; 310; 30; 20 MG/100ML; MG/100ML; MG/100ML; MG/100ML
125 INJECTION, SOLUTION INTRAVENOUS CONTINUOUS
Status: DISCONTINUED | OUTPATIENT
Start: 2025-05-01 | End: 2025-05-01

## 2025-05-01 RX ORDER — LIDOCAINE HYDROCHLORIDE 10 MG/ML
0.5 INJECTION, SOLUTION EPIDURAL; INFILTRATION; INTRACAUDAL; PERINEURAL ONCE AS NEEDED
Status: DISCONTINUED | OUTPATIENT
Start: 2025-05-01 | End: 2025-05-01 | Stop reason: HOSPADM

## 2025-05-01 RX ORDER — ROPIVACAINE HYDROCHLORIDE 2 MG/ML
13 INJECTION, SOLUTION EPIDURAL; INFILTRATION; PERINEURAL CONTINUOUS
Status: DISCONTINUED | OUTPATIENT
Start: 2025-05-01 | End: 2025-05-01

## 2025-05-01 RX ADMIN — EPHEDRINE SULFATE 10 MG: 5 INJECTION INTRAVENOUS at 11:04

## 2025-05-01 RX ADMIN — SODIUM CHLORIDE, POTASSIUM CHLORIDE, SODIUM LACTATE AND CALCIUM CHLORIDE 125 ML/HR: 600; 310; 30; 20 INJECTION, SOLUTION INTRAVENOUS at 02:45

## 2025-05-01 RX ADMIN — Medication 1 APPLICATION: at 22:16

## 2025-05-01 RX ADMIN — ACETAMINOPHEN 650 MG: 325 TABLET ORAL at 17:55

## 2025-05-01 RX ADMIN — WITCH HAZEL: 500 SOLUTION RECTAL; TOPICAL at 22:16

## 2025-05-01 RX ADMIN — ONDANSETRON 4 MG: 2 INJECTION INTRAMUSCULAR; INTRAVENOUS at 10:24

## 2025-05-01 RX ADMIN — BUTORPHANOL TARTRATE 2 MG: 2 INJECTION, SOLUTION INTRAMUSCULAR; INTRAVENOUS at 08:15

## 2025-05-01 RX ADMIN — ROPIVACAINE HYDROCHLORIDE 8 ML: 5 INJECTION, SOLUTION EPIDURAL; INFILTRATION; PERINEURAL at 10:42

## 2025-05-01 RX ADMIN — FENTANYL CITRATE 100 MCG: 50 INJECTION, SOLUTION INTRAMUSCULAR; INTRAVENOUS at 10:40

## 2025-05-01 RX ADMIN — Medication: at 22:16

## 2025-05-01 RX ADMIN — SODIUM CHLORIDE, POTASSIUM CHLORIDE, SODIUM LACTATE AND CALCIUM CHLORIDE 125 ML/HR: 600; 310; 30; 20 INJECTION, SOLUTION INTRAVENOUS at 13:50

## 2025-05-01 RX ADMIN — ROPIVACAINE HYDROCHLORIDE 13 ML/HR: 2 INJECTION, SOLUTION EPIDURAL; INFILTRATION at 10:44

## 2025-05-01 RX ADMIN — Medication 2 MILLI-UNITS/MIN: at 03:52

## 2025-05-01 RX ADMIN — EPHEDRINE SULFATE 10 MG: 5 INJECTION INTRAVENOUS at 12:21

## 2025-05-01 RX ADMIN — Medication 333 MILLI-UNITS/MIN: at 18:03

## 2025-05-01 RX ADMIN — LIDOCAINE HYDROCHLORIDE AND EPINEPHRINE 3 ML: 15; 5 INJECTION, SOLUTION EPIDURAL at 10:38

## 2025-05-01 RX ADMIN — SODIUM CHLORIDE, POTASSIUM CHLORIDE, SODIUM LACTATE AND CALCIUM CHLORIDE 125 ML/HR: 600; 310; 30; 20 INJECTION, SOLUTION INTRAVENOUS at 09:04

## 2025-05-01 NOTE — L&D DELIVERY NOTE
" [facility]   Vaginal Delivery Note    Patient Name: Li Stinson  : 1995  MRN: [id number]    Date of Delivery: 2025    Diagnosis     Pre & Post-Delivery:  Intrauterine pregnancy at 38w1d  Labor status: Induced Onset of Labor     (spontaneous vaginal delivery)             Problem List    Transfer to Postpartum     Review the Delivery Report for details.     Delivery     Delivery: Vaginal, Spontaneous    YOB: 2026   Time of Birth:  Gestational Age 5:27 PM  38w1d     Anesthesia: Epidural    Delivering clinician: Kaila Alvarado   Forceps?   No   Vacuum? No    Shoulder dystocia present: No        Delivery narrative:   over short second stage.  Nuchal x 1.  Intact perineum.  Placenta spontaneous and intact.  Uterus explored and empty       Infant     Findings: female infant     Infant observations: Weight: 2565 g (5 lb 10.5 oz)  Length: 19.5 in  Observations/Comments:        Apgars: 6  @ 1 minute /    9  @ 5 minutes   Infant Name:      Placenta & Cord         Placenta delivered  Spontaneous at   2025  5:30 PM    Cord: 3 vessels present.   Nuchal Cord?  yes; Number of nuchal loops present:  1   Cord blood obtained: Yes   Cord gases obtained:  No   Cord gas results: Venous:  No results found for: \"PHCVEN\", \"BECVEN\"    Arterial:  No results found for: \"PHCART\", \"BECART\"     Repair     Episiotomy: None    No    Lacerations: No   Estimated Blood Loss: Est. Blood Loss (mL): 100 mL (Filed from Delivery Summary) (25 5207)     Quantitative Blood Loss:        TOTAL BLOOD LOSS : 100 mL (2025  1:29 AM - 2025  7:38 PM)  Complications     none    Disposition     Mother to Mother Baby/Postpartum  in stable condition currently.  Baby to remains with mom  in stable condition currently.    Kaila Alvarado MD  25  19:38 EDT        "

## 2025-05-01 NOTE — ANESTHESIA PROCEDURE NOTES
Labor Epidural      Patient reassessed immediately prior to procedure    Patient location during procedure: OB  Performed By  CRNA/CAA: Carla Erazo CRNA  Preanesthetic Checklist  Completed: patient identified, IV checked, risks and benefits discussed, surgical consent, monitors and equipment checked, pre-op evaluation and timeout performed  Prep:  Pt Position:sitting  Sterile Tech:cap, gloves, mask and sterile barrier  Prep:DuraPrep  Monitoring:blood pressure monitoring  Epidural Block Procedure:  Approach:midline  Guidance:palpation technique  Location:L3-L4  Needle Type:Tuohy  Needle Gauge:17 G  Loss of Resistance Medium: saline  Loss of Resistance: 6cm  Cath Depth at skin:12 cm  Paresthesia: none  Aspiration:negative  Test Dose:negative  Number of Attempts: 1  Post Assessment:  Dressing:occlusive dressing applied and secured with tape  Pt Tolerance:patient tolerated the procedure well with no apparent complications  Complications:no

## 2025-05-01 NOTE — H&P
"History and Physical  [facility]    Chief Complaint   Patient presents with    Scheduled Induction         Pregnant       Li Stinson is a 30 y.o. year old  with an Estimated Date of Delivery: 25 currently at 38w1d presenting for induction due to IUGR.    Prenatal care has been with Dr. Cr.  It has been significant for IUGR with AC <10 %.  Active Hospital Problems    Diagnosis     **Pregnant         No Additional Complaints Reported    The following portions of the patient's history were reviewed and updated as appropriate:vital signs, allergies, current medications, past medical history, past social history, past surgical history, and problem list.    Review of Systems  A comprehensive review of systems was negative.     Objective     /65   Pulse 63   Temp 97.9 °F (36.6 °C) (Axillary)   Resp 20   Ht 154.9 cm (61\")   Wt 65.3 kg (144 lb)   LMP 2024   Breastfeeding No   BMI 27.21 kg/m²     Physical Exam    General:  well developed; well nourished  no acute distress  mentation appropriate           Abdomen: soft, non-tender; no masses  no umbilical or inguinal hernias are present  no hepato-splenomegaly       FHT's: reactive and category 1   Cervix: 50   [place]: Contraction are irregular     Lab Review   Labs: No data reviewed   Lab Results (last 24 hours)       Procedure Component Value Units Date/Time    Treponema pallidum AB w/Reflex RPR [[id number]]  (Normal) Collected: 25    Specimen: Blood Updated: 25 0941     Treponemal AB Total Non-Reactive    Narrative:      Reactive results will reflex RPR testing.    CBC (No Diff) [[id number]]  (Abnormal) Collected: 25    Specimen: Blood Updated: 25 0306     WBC 9.13 10*3/mm3      RBC 3.56 10*6/mm3      Hemoglobin 9.5 g/dL      Hematocrit 30.6 %      MCV 86.0 fL      MCH 26.7 pg      MCHC 31.0 g/dL      RDW 14.9 %      RDW-SD 46.6 fl      MPV 10.7 fL      Platelets 263 10*3/mm3       "       Imaging   No data reviewed   Imaging Results (Most Recent)       None          Assessment & Plan     ASSESSMENT  IUP at 38w1d  IUGR  GBS neg  Rh pos     PLAN  Admit for cervical ripening and induction of labor.         Kaila Alvarado MD  5/1/202514:51 EDT

## 2025-05-01 NOTE — PROCEDURES
Transcervical Wetzel placed per physician request.  FHT's class 1.  Patient tolerated well. 24 [place], 60ml.    Abhijit Edwards MD  5/1/2025  03:11 EDT

## 2025-05-01 NOTE — ANESTHESIA PREPROCEDURE EVALUATION
Anesthesia Evaluation     Patient summary reviewed and Nursing notes reviewed   NPO Solid Status: > 2 hours  NPO Liquid Status: > 2 hours           Airway   Dental      Pulmonary - negative pulmonary ROS   Cardiovascular - negative cardio ROS        Neuro/Psych- negative ROS  GI/Hepatic/Renal/Endo - negative ROS     Musculoskeletal (-) negative ROS    Abdominal    Substance History - negative use     OB/GYN negative ob/gyn ROS   (+) Pregnant    Comment: IUGR      Other                    Anesthesia Plan    ASA 2     epidural       Anesthetic plan, risks, benefits, and alternatives have been provided, discussed and informed consent has been obtained with: patient.    CODE STATUS:    Code Status (Patient has no pulse and is not breathing): CPR (Attempt to Resuscitate)  Medical Interventions (Patient has pulse or is breathing): Full Support  Level Of Support Discussed With: Patient

## 2025-05-02 ENCOUNTER — PATIENT OUTREACH (OUTPATIENT)
Dept: LABOR AND DELIVERY | Facility: HOSPITAL | Age: 30
End: 2025-05-02
Payer: COMMERCIAL

## 2025-05-02 LAB
BASOPHILS # BLD AUTO: 0.03 10*3/MM3 (ref 0–0.2)
BASOPHILS NFR BLD AUTO: 0.2 % (ref 0–1.5)
DEPRECATED RDW RBC AUTO: 47.5 FL (ref 37–54)
EOSINOPHIL # BLD AUTO: 0.03 10*3/MM3 (ref 0–0.4)
EOSINOPHIL NFR BLD AUTO: 0.2 % (ref 0.3–6.2)
ERYTHROCYTE [DISTWIDTH] IN BLOOD BY AUTOMATED COUNT: 14.7 % (ref 12.3–15.4)
HCT VFR BLD AUTO: 29.6 % (ref 34–46.6)
HGB BLD-MCNC: 9 G/DL (ref 12–15.9)
IMM GRANULOCYTES # BLD AUTO: 0.06 10*3/MM3 (ref 0–0.05)
IMM GRANULOCYTES NFR BLD AUTO: 0.4 % (ref 0–0.5)
LYMPHOCYTES # BLD AUTO: 2.02 10*3/MM3 (ref 0.7–3.1)
LYMPHOCYTES NFR BLD AUTO: 13.2 % (ref 19.6–45.3)
MCH RBC QN AUTO: 26.6 PG (ref 26.6–33)
MCHC RBC AUTO-ENTMCNC: 30.4 G/DL (ref 31.5–35.7)
MCV RBC AUTO: 87.6 FL (ref 79–97)
MONOCYTES # BLD AUTO: 1.28 10*3/MM3 (ref 0.1–0.9)
MONOCYTES NFR BLD AUTO: 8.4 % (ref 5–12)
NEUTROPHILS NFR BLD AUTO: 11.84 10*3/MM3 (ref 1.7–7)
NEUTROPHILS NFR BLD AUTO: 77.6 % (ref 42.7–76)
NRBC BLD AUTO-RTO: 0 /100 WBC (ref 0–0.2)
PLATELET # BLD AUTO: 239 10*3/MM3 (ref 140–450)
PMV BLD AUTO: 10.5 FL (ref 6–12)
RBC # BLD AUTO: 3.38 10*6/MM3 (ref 3.77–5.28)
WBC NRBC COR # BLD AUTO: 15.26 10*3/MM3 (ref 3.4–10.8)

## 2025-05-02 PROCEDURE — 0503F POSTPARTUM CARE VISIT: CPT | Performed by: ADVANCED PRACTICE MIDWIFE

## 2025-05-02 PROCEDURE — 85025 COMPLETE CBC W/AUTO DIFF WBC: CPT | Performed by: OBSTETRICS & GYNECOLOGY

## 2025-05-02 RX ADMIN — DOCUSATE SODIUM 100 MG: 100 CAPSULE, LIQUID FILLED ORAL at 08:05

## 2025-05-02 RX ADMIN — IBUPROFEN 600 MG: 600 TABLET, FILM COATED ORAL at 13:21

## 2025-05-02 RX ADMIN — IBUPROFEN 600 MG: 600 TABLET, FILM COATED ORAL at 23:47

## 2025-05-02 RX ADMIN — IBUPROFEN 600 MG: 600 TABLET, FILM COATED ORAL at 08:05

## 2025-05-02 RX ADMIN — ACETAMINOPHEN 650 MG: 325 TABLET ORAL at 01:43

## 2025-05-02 NOTE — PROGRESS NOTES
5/2/2025  PPD #1    Subjective   Li feels well.  Patient describes her lochia as less than menses.  Pain is well controlled.  She is breastfeeding.        Objective   Temp: Temp:  [97.8 °F (36.6 °C)-98.3 °F (36.8 °C)] 98.3 °F (36.8 °C) Temp src: Oral   BP: BP: ()/(52-86) 128/78        Pulse: Heart Rate:  [59-91] 66  RR: Resp:  [16-20] 18    General:  No acute distress   Abdomen: Fundus firm and beneath umbilicus   Pelvis: deferred     Lab Results   Component Value Date    WBC 15.26 (H) 05/02/2025    HGB 9.0 (L) 05/02/2025    HCT 29.6 (L) 05/02/2025    MCV 87.6 05/02/2025     05/02/2025    HEPBSAG Negative 11/05/2024    AST 18 04/07/2025    ALT 7 04/07/2025       Assessment  PPD# 1 after vaginal delivery    Plan  Continue routine postpartum care, anticipate discharge in a.m.      This note has been electronically signed.    Rosie Stern, CHANA  13:24 EDT  May 2, 2025

## 2025-05-02 NOTE — CASE MANAGEMENT/SOCIAL WORK
Continued Stay Note  [facility]     Patient Name: Li Stinson  MRN: [id number]  Today's Date: 5/2/2025    Admit Date: 5/1/2025    Plan: MSW available   Discharge Plan       Row Name 05/02/25 1315       Plan    Plan MSW available    Plan Comments Met with pt re: SDOH concerns. She reports she needs assistance with her gas bill. Provided listing of local resources ([employer], Adult Services, [employer], etc). Pt to call for assistance.                   Discharge Codes    No documentation.                       EVANGELINA Diaz

## 2025-05-02 NOTE — CONSULTS
Visited patient per patient's request for information on [facility].  Provided patient with information plus education on the two parts of the document. (Health care surrogate and end of life care) Invited the patient to have her RN call a  if she has questions and/or would like to complete the document.

## 2025-05-02 NOTE — ANESTHESIA POSTPROCEDURE EVALUATION
Patient: Li Stinson    Procedure Summary       Date: 05/01/25 Room / Location:     Anesthesia Start: 1026 Anesthesia Stop: 1730    Procedure: LABOR ANALGESIA Diagnosis:     Scheduled Providers:  Provider: Monica Moran DO    Anesthesia Type: epidural ASA Status: 2            Anesthesia Type: epidural    Vitals  Vitals Value Taken Time   /78 05/02/25 09:25   Temp 98.3 °F (36.8 °C) 05/02/25 08:57   Pulse 66 05/02/25 09:25   Resp 18 05/02/25 09:25   SpO2             Post Anesthesia Care and Evaluation    Patient location during evaluation: bedside  Patient participation: complete - patient participated  Level of consciousness: awake and alert  Pain management: adequate    Airway patency: patent  Anesthetic complications: No anesthetic complications    Cardiovascular status: acceptable  Respiratory status: acceptable  Hydration status: acceptable  Post Neuraxial Block status: Motor and sensory function returned to baseline and No signs or symptoms of PDPH

## 2025-05-02 NOTE — SIGNIFICANT NOTE
05/01/25 2100   Maternal Information   Date of Referral 05/01/25   Person Making Referral lactation consultant   Infant Reason for Referral low birth weight   Maternal Assessment   Breast Size Issue none   Breast Shape Bilateral:;round   Breast Density Bilateral:;soft   Areola Bilateral:;firm   Nipples Bilateral:;everted   Left Nipple Symptoms intact;nontender   Right Nipple Symptoms intact;nontender   Maternal Infant Feeding   Maternal Emotional State receptive;relaxed   Infant Positioning cradle;cross-cradle  (L)   Signs of Milk Transfer suck/swallow ratio   Pain with Feeding no   Nipple Shape After Feeding, Left Breast round;symmetrical;appropriately projected   Latch Assistance full assistance needed;verbal guidance offered   Support Person Involvement actively supporting mother   Milk Expression/Equipment   Breast Pump Type double electric, personal  ('wearable', unsure of brand)   Equipment for Home Use new breast pump purchased       Courtesy visit for newly postpartum couplet with prior breastfeeding difficulty and low birth weight of the infant. MOB states she attempted to breastfeed first child but had difficulty and did not attempt to breastfeed second child (ages 13 and 8). Desires to breastfeed this infant. Offered to help with latch assistance and MOB agreed. Infant unswaddled and placed in L cross cradle in which infant was able to latch quickly and appeared eager at the breast with several suck swallow ratios present. After a few minutes MOB able to adjust position to R cradle while maintaining deep latch. Infant remained latched approx 4 minutes on L breast during my visit. RN aware.    Educational handouts provided, booklet beginning on page 35 reviewed with patient regarding breastfeeding education. Encouraged to call out for concerns through hospitalization and f/u with outpatient clinic PRN after discharge. Denies further questions or concerns at this time.

## 2025-05-02 NOTE — LACTATION NOTE
05/02/25 1145   Maternal Information   Date of Referral 05/02/25   Person Making Referral lactation consultant   Maternal Reason for Referral no prior breastfeeding experience   Infant Reason for Referral disinterest in latch;sleepy;low birth weight   Maternal Assessment   Left Nipple Symptoms nontender   Right Nipple Symptoms nontender   Maternal Infant Feeding   Maternal Emotional State receptive   Support Person Involvement actively supporting mother     Courtesy follow up visit. Infant skin to skin with MOB at this time. Parents with many appropriate questions. Baby has been spitty, MOB reports she delivered with 4 pushes. Reviewed amniotic fluid in belly may cause disinterested in latch. Strongly encouraged lots of skin to skin time and to call for latch check with next attempt. Per RN, MOB has hand expressed drops into infant's mouth.

## 2025-05-02 NOTE — PAYOR COMM NOTE
"Li Stinson (30 y.o. Female)     Notification of Delivery  ACC/Utilization Review  Phone: [phone]  Fax: [phone]    [place] [address]        Date of Birth   1995    Social Security Number       Address   [address] [address]    Home Phone       MRN   [id number]       Latter day   Catholic    Marital Status   Single                            Admission Date   5/1/2025    Admission Type   Elective    Admitting Provider   Kaila Alvarado MD    Attending Provider   Kaila Alvarado MD    Department, Room/Bed   [facility] MOTHER BABY 4A, N416/1       Discharge Date       Discharge Disposition       Discharge Destination                                 Attending Provider: Kaila Alvarado MD    Allergies: No Known Allergies    Isolation: None   Infection: None   Code Status: CPR    Ht: 154.9 cm (61\")   Wt: 65.3 kg (144 lb)    Admission Cmt: None   Principal Problem: Pregnant [Z34.90]                   Active Insurance as of 5/1/2025       Primary Coverage       Payor Plan Insurance Group Employer/Plan Group    [employer] [place] [employer] [place]ICA[place]        Payor Plan Address Payor Plan Phone Number Payor Plan Fax Number Effective Dates    [address] [address] [phone]  5/5/2016 - None Entered    [facility] [id number]         Subscriber Name Subscriber Birth Date Member ID       LI STINSON 1995 [id number]                     Emergency Contacts        (Rel.) Home Phone Work Phone Mobile Phone    Hilario Stinson (Mother) -- -- [phone]              Insurance Information                  [facility]/WELLCARE MEDICAID Phone: [phone]    Subscriber: Li Stinson Subscriber#: [id number]    Group#: -- Precert#: --    Authorization#: -- Effective Date: --             History & Physical        Kaila Alvarado MD at 05/01/25 0300          History and Physical  [facility]    Chief Complaint   Patient presents with    Scheduled Induction         Pregnant       Li Stinson is a 30 y.o. year old  with an Estimated Date of Delivery: 25 currently at 38w1d presenting for induction due to IUGR.    Prenatal care has been with Dr. Cr.  It has been significant for IUGR with AC <10 %.  Active Hospital Problems    Diagnosis     **Pregnant         No Additional Complaints Reported    The following portions of the patient's history were reviewed and updated as appropriate:vital signs, allergies, current medications, past medical history, past social history, past surgical history, and problem list.    Review of Systems  A comprehensive review of systems was negative.     Objective     /65   Pulse 63   Temp 97.9 °F (36.6 °C) (Axillary)   Resp 20   Ht 154.9 cm (61\")   Wt 65.3 kg (144 lb)   LMP 2024   Breastfeeding No   BMI 27.21 kg/m²     Physical Exam    General:  well developed; well nourished  no acute distress  mentation appropriate           Abdomen: soft, non-tender; no masses  no umbilical or inguinal hernias are present  no hepato-splenomegaly       FHT's: reactive and category 1   Cervix: 50   [place]: Contraction are irregular     Lab Review   Labs: No data reviewed   Lab Results (last 24 hours)       Procedure Component Value Units Date/Time    Treponema pallidum AB w/Reflex RPR [[id number]]  (Normal) Collected: 25    Specimen: Blood Updated: 25 0941     Treponemal AB Total Non-Reactive    Narrative:      Reactive results will reflex RPR testing.    CBC (No Diff) [[id number]]  (Abnormal) Collected: 25    Specimen: Blood Updated: 25 0306     WBC 9.13 10*3/mm3      RBC 3.56 10*6/mm3      Hemoglobin 9.5 g/dL      Hematocrit 30.6 %      MCV 86.0 fL      MCH 26.7 pg      MCHC 31.0 g/dL      RDW 14.9 %      RDW-SD 46.6 fl      MPV 10.7 fL      Platelets 263 10*3/mm3             Imaging   No data reviewed   Imaging " Results (Most Recent)       None          Assessment & Plan     ASSESSMENT  IUP at 38w1d  IUGR  GBS neg  Rh pos     PLAN  Admit for cervical ripening and induction of labor.         Kaila Alvarado MD  4:51 EDT    Electronically signed by Kaila Alvarado MD at 25 1828          Operative/Procedure Notes (last 48 hours)        Abhijit Edwards MD at 25 0310        Procedure Orders    1. Wetzel Bulb Cervical Ripening Without Infusion [[id number]] ordered by Kaila Alvarado MD at 25 1428               Pre-procedure Diagnoses    1. 38 weeks gestation of pregnancy [Z3A.38]    2. IUGR (intrauterine growth restriction) affecting care of mother, third trimester, fetus 1 [O36.5931]    3. Rigid cervix (uteri) affecting pregnancy, third trimester [O34.43]               Post-procedure Diagnoses    1. 38 weeks gestation of pregnancy [Z3A.38]    2. IUGR (intrauterine growth restriction) affecting care of mother, third trimester, fetus 1 [O36.5931]    3. Rigid cervix (uteri) affecting pregnancy, third trimester [O34.43]                 Transcervical Wetzel placed per physician request.  FHT's class 1.  Patient tolerated well. 24 [place]vor[place], 60ml.    Abhijit Edwards MD  2025  03:11 EDT       Electronically signed by Abhijit Edwards MD at 25 0311       Kaila Alvardao MD at 25 1938           [facility]   Vaginal Delivery Note    Patient Name: Li Stinson  : 1995  MRN: [id number]    Date of Delivery: 2025    Diagnosis     Pre & Post-Delivery:  Intrauterine pregnancy at 38w1d  Labor status: Induced Onset of Labor     (spontaneous vaginal delivery)             Problem List    Transfer to Postpartum     Review the Delivery Report for details.     Delivery     Delivery: Vaginal, Spontaneous    YOB: 2026   Time of Birth:  Gestational Age 5:27 PM  38w1d     Anesthesia: Epidural    Delivering clinician: Kaila Alvarado   Forceps?   No   Vacuum? No    Shoulder dystocia  "present: No        Delivery narrative:   over short second stage.  Nuchal x 1.  Intact perineum.  Placenta spontaneous and intact.  Uterus explored and empty       Infant     Findings: female infant     Infant observations: Weight: 2565 g (5 lb 10.5 oz)  Length: 19.5 in  Observations/Comments:        Apgars: 6  @ 1 minute /    9  @ 5 minutes   Infant Name:      Placenta & Cord         Placenta delivered  Spontaneous at   2025  5:30 PM    Cord: 3 vessels present.   Nuchal Cord?  yes; Number of nuchal loops present:  1   Cord blood obtained: Yes   Cord gases obtained:  No   Cord gas results: Venous:  No results found for: \"PHCVEN\", \"BECVEN\"    Arterial:  No results found for: \"PHCART\", \"BECART\"     Repair     Episiotomy: None    No    Lacerations: No   Estimated Blood Loss: Est. Blood Loss (mL): 100 mL (Filed from Delivery Summary) (25 3627)     Quantitative Blood Loss:        TOTAL BLOOD LOSS : 100 mL (2025  1:29 AM - 2025  7:38 PM)  Complications     none    Disposition     Mother to Mother Baby/Postpartum  in stable condition currently.  Baby to remains with mom  in stable condition currently.    Kaila Alvarado MD  25  19:38 EDT          Electronically signed by Kaila Alvarado MD at 25 1939          Physician Progress Notes (last 48 hours)        Kaila Alvarado MD at 25 0830          4/80/-1  AROM  IUPC placed.  NST rx    Electronically signed by Kaila Alvarado MD at 25 1829       "

## 2025-05-02 NOTE — LACTATION NOTE
25 1312   Maternal Information   Date of Referral 25   Person Making Referral lactation consultant   Maternal Reason for Referral breastfeeding currently   Infant Reason for Referral  infant   Maternal Assessment   Breast Shape Bilateral:;round   Breast Density Bilateral:;soft   Nipples Bilateral:;everted;graspable   Left Nipple Symptoms intact;nontender   Right Nipple Symptoms intact;nontender   Maternal Infant Feeding   Maternal Emotional State receptive   Infant Positioning cross-cradle   Signs of Milk Transfer deep jaw excursions noted;other (see comments)  (easily hand expressed)   Pain with Feeding no   Comfort Measures Before/During Feeding infant position adjusted;maternal position adjusted   Latch Assistance full assistance needed;minimal assistance;verbal guidance offered   Breast Pumping   Breast Pumping Interventions other (see comments)  (enc to pump for short or missed feedings herbert after 24hr)   Lactation Referrals   Lactation Referrals outpatient lactation program   Outpatient Lactation Program Lactation Follow-up Date/Time as needed     Courtesy follow up visit per patient request for latch check. Infant sleeping in MOB arms. LC brought to crib to wake up. Sat up and burped, opened eyes, sucked on gloved finger. Brought to breast in cross cradle on right, infant with wide gape and latched on. She was on and off breast a lot over 10 min feeding, but colostrum easily hand expressed. May need SLP consult if unable to maintain latch as time goes on. Encouraged to call as needs arise.

## 2025-05-02 NOTE — OUTREACH NOTE
Motherhood Connection  IP Postpartum    Questions/Answers      Flowsheet Row Responses   Best Method for Contacting Cell   Support Person Present No   Does the patient have a car seat at the hospital No   Car Seat Comment Someone will bring it later   Delivery Note Reviewed Reviewed   Were birth expectations met? Yes   Lactation Note Reviewed Reviewed   Is additional support needed? Yes   Yes, additional support needed comment Did not breast feed other children, baby has been sleepy   Any questions or concerns? No   Is the patient going to use Meds to Beds? Yes   Any concerns related discharge meds/ability to  prescriptions? No   OB Discharge Navigator Reviewed  Not Reviewed   Comment ongoing PP care   Confirm Postpartum OB appointment Yes   Postpartum OB appointment date 06/10/25   Confirm initial well-child Pediatrician appointment date/time: Yes  [Dr. Obrien]   Initial Well Child Pediatrician Appointment Date 25   Additional post-discharge F/U appointments No   Does patient have transportation to appointments? Yes   Any other assistance needed to ensure she is able to attend appointments? No   Does patient have supplies needed at home for  care? Clothing, Crib, Diapers            Feeling well after delivery. Provided with outpatient lactation clinic contact information for breastfeeding or pumping assistance after discharge. Signs and symptoms of preeclampsia reviewed. VU. Provided with POST Birth warning signs flyer from [employer]. Discussed looking back at previous [employer] message for financial resources which may be able to help with utilities.Encouraged to call with questions, concerns, or for support. Contact information provided. Will f/u 25.    ANTONIO Dupree RN  Maternity Nurse Navigator    2025, 15:55 EDT

## 2025-05-03 VITALS
HEIGHT: 61 IN | TEMPERATURE: 98.1 F | DIASTOLIC BLOOD PRESSURE: 75 MMHG | HEART RATE: 70 BPM | WEIGHT: 144 LBS | BODY MASS INDEX: 27.19 KG/M2 | RESPIRATION RATE: 16 BRPM | SYSTOLIC BLOOD PRESSURE: 129 MMHG

## 2025-05-03 PROCEDURE — 0503F POSTPARTUM CARE VISIT: CPT | Performed by: NURSE PRACTITIONER

## 2025-05-03 RX ADMIN — IBUPROFEN 600 MG: 600 TABLET, FILM COATED ORAL at 08:30

## 2025-05-03 RX ADMIN — DOCUSATE SODIUM 100 MG: 100 CAPSULE, LIQUID FILLED ORAL at 08:30

## 2025-05-03 NOTE — DISCHARGE SUMMARY
Discharge Summary    Date of Admission: 2025  Date of Discharge:  5/3/2025      Patient: Li Stinson      MR#:[id number]    Delivery Provider: Kaila Alvarado    Attending Provider: Kaila Alvarado MD    Presenting Problem/History of Present Illness  Pregnant [Z34.90]  Pregnant [Z34.90]   (spontaneous vaginal delivery) [O80]        (spontaneous vaginal delivery)         Discharge Diagnosis: Vaginal delivery at 38w1d    Procedures:  Vaginal, Spontaneous    2025   5:27 PM       Discharge Date: 5/3/2025;     Hospital Course  Patient is a 30 y.o. female  at 38w1d status post vaginal delivery without complication. Postpartum the patient did well. She remained afebrile, with vital signs stable. She was ready for discharge on postpartum day 2.     Infant:   female fetus 2565 g (5 lb 10.5 oz) with Apgar scores of 6 , 9  at five minutes.    Condition on Discharge:  Stable    Vital Signs  Temp:  [98.1 °F (36.7 °C)-98.6 °F (37 °C)] 98.1 °F (36.7 °C)  Heart Rate:  [64-75] 70  Resp:  [16-18] 16  BP: (116-141)/(61-75) 129/75    Lab Results   Component Value Date    WBC 15.26 (H) 2025    HGB 9.0 (L) 2025    HCT 29.6 (L) 2025    MCV 87.6 2025     2025       Discharge Disposition  Home or Self Care    Discharge Medications     Discharge Medications        Continue These Medications        Instructions Start Date   docusate sodium 100 MG capsule  Commonly known as: COLACE   100 mg, 2 Times Daily      ferrous sulfate 324 (65 Fe) MG tablet delayed-release EC tablet   324 mg, Daily With Breakfast      Prenatal 27-1 27-1 MG tablet tablet   1 tablet, Oral, Daily               Discharge Diet:     Activity at Discharge:   Activity Instructions       Activity as Tolerated      Gradually Increase Activity Until at Pre-Hospitalization Level      Pelvic Rest      Sexual Activity Restrictions      Type of Restriction: Sex    Explain Sexual Activity Restrictions: Nothing in the vagina  for 6 weeks    Work Restrictions      Type of Restriction: Work    May Return to Work: After Next Appointment    With / Without Restrictions: Without Restrictions            Follow-up Appointments  Future Appointments   Date Time Provider Department [place]   6/10/2025 10:00 AM Kaila Alvarado MD [facility]  [id number]     Additional Instructions for the Follow-ups that You Need to Schedule       Call MD With Problems / Concerns   As directed      Instructions: Call for temp >/= 100.4, severe pain, severe bleeding, headache that does not improve with rest, medication, blurred vision, or postpartum depression. Monitor incision for signs of infection including, foul odor, discharge or separation of the wound.  Call for blood clots larger than a golf ball or saturating a pad in less than an hour.    Order Comments: Instructions: Call for temp >/= 100.4, severe pain, severe bleeding, headache that does not improve with rest, medication, blurred vision, or postpartum depression. Monitor incision for signs of infection including, foul odor, discharge or separation of the wound.  Call for blood clots larger than a golf ball or saturating a pad in less than an hour.         Discharge Follow-up with Specified Provider: Dr. Alvarado; 6 Weeks   As directed      To: Dr. Alvarado   Follow Up: 6 Weeks                Grayson Castillo, CHANA  05/03/25  11:11 EDT  Csd

## 2025-05-03 NOTE — LACTATION NOTE
05/03/25 1105   Maternal Information   Date of Referral 05/03/25   Person Making Referral lactation consultant  (Courtesy consult)   Maternal Reason for Referral   (Mom attempted to breast-feed first baby, but too painful.  Did not attempt to breast-feed second baby.)   Infant Reason for Referral   (Reports baby is breast-feeding better.  Is not sure if she will pump and bottle feed or continue breast-feeding.)   Milk Expression/Equipment   Breast Pump Type double electric, hospital grade  (Has a hands 3 pump at home.)   Breast Pumping   Breast Pumping Interventions   (Discussed pumping every 3 hours if she switches to exclusively pumping.  This will provide breastmilk supply if possible.  Gave syringes to pull up small volumes of colostrum before milk comes in.)     Discussed milk supply, had to pull up small volumes of colostrum and finger/syringe feed to baby,  breast care, how to avoid and treat engorgement, follow-up for pediatrician visit.  Gave lactation services business card and encouraged to call lactation, if there are questions or concerns, or if mom wants  and outpatient lactation clinic appointment.

## 2025-05-06 ENCOUNTER — TELEPHONE (OUTPATIENT)
Dept: LACTATION | Facility: HOSPITAL | Age: 30
End: 2025-05-06
Payer: COMMERCIAL

## 2025-05-06 NOTE — TELEPHONE ENCOUNTER
Baby delivered May 1, 2025. Mom is having trouble latching and would like clinic appt to help with latch. Mom states she has been pumping about 2 oz every 3 hours.      Encouraged to continue pumping every 3 hours to get milk in and get good supply. Attempt to put baby to the breast a couple times per day, to keep baby familiar with the breast. Also, to do as much skin to skin as possible.     Appt made for Friday, May 9, at 0945.  Gave instructions about where to come and when to feed baby before consult.

## 2025-05-09 ENCOUNTER — HOSPITAL ENCOUNTER (OUTPATIENT)
Dept: LACTATION | Facility: HOSPITAL | Age: 30
Discharge: HOME | End: 2025-05-09

## 2025-05-09 NOTE — LACTATION NOTE
PATIENT: Jaylin Sequeira  : 2026  PEDIATRICIAN: Erwin Obrien  AGE: 8 days  GESTATION @ BIRTH: 38 weeks 1 day  BIRTH WEIGHT: 5 lb 10.8 oz  DC WEIGHT: 5 lb 6.9 oz    WEIGHT:  5 lb 2 oz   CURRENT WEIGHT: 5 lb 6.6 oz    MOM'S NAME:  Li Stinson  : 1995  PHONE: [phone]  REASON FOR VISIT: painful latch, pumping q 3 hours    BREAST/SUPPLY CONCERNS:   None.  Mom has been nursing and pumping occasionally at home.  Infant is gaining well on breast milk.    Mom has been nursing/feeding infant every 2 hours since pedicatrician appointment on .       EXAM/ASSESSMENT:  Good strong suction and deep pulls.  Latched well with nipple to nose positioning and tummy to mom.  Make sure Jaylin is positioned high up so mom is not hunched over toward.     Jaylin latched and nursed well needing little stimulation to keep her nursing.  Swallows heard throughout feeding.  No leaking at the breast, clicking, or choking.      Tips given to mom on how to accelerate a feeding or get Jaylin nursing well again when she is taking a little break.  Jaylin offered return to the breast after nursing on the second side but showed no feeding cues or interest in nursing.  Sleeping after nursing.     preweight: 2455 g  left breast, FB hold, 17 minutes: 2475 g  right breast, FB hold, 13 minutes: 2490 g  Transferred: 35 mls      *Jaylin needs 13.5 oz per day at her current weight.      *Jaylin needs 34 mls per feeding if nursing every 2 hours (12 times a day).      Jaylin transferred adequately for q 2 hour feedings that her mom has been providing.  Her latch looks beautiful.  It is  not longer painful to nurse.        PLAN OF CARE:  Li encouraged to continue feeding q 2 hours until returned to birth weight or otherwise directed by pediatrician.      After return to birthweight,  mom may start to allow Jaylin to feed on demand, offering the breast at least 8 times daily and decrease her pumping amount.      Follow-up with  lactation clinic as needed.       05/09/25 1000   Maternal Information   Date of Referral 05/09/25   Person Making Referral patient;physician  (Pediatrician encouraged visit.)   Maternal Reason for Referral latch difficulty  (painful latching)   Infant Reason for Referral low birth weight;weight gain inadequate   Maternal Assessment   Breast Size Issue none   Breast Shape Bilateral:;round   Breast Density Bilateral:;soft   Nipples Bilateral:;everted   Left Nipple Symptoms intact;nontender   Right Nipple Symptoms intact;nontender   Maternal Infant Feeding   Maternal Emotional State receptive;relaxed   Infant Positioning clutch/football   Signs of Milk Transfer audible swallow;deep jaw excursions noted   Pain with Feeding no   Comfort Measures Before/During Feeding infant position adjusted;latch adjusted;maternal position adjusted   Nipple Shape After Feeding, Left Breast round;symmetrical;appropriately projected   Latch Assistance verbal guidance offered   Support Person Involvement verbally supports mother   Feeding Infant   Feeding Readiness Cues eager;hand to mouth movements   Satiety Cues cessation of sucking   Feeding Tolerance/Success intermittent pauses;coordinated suck/swallow/breathing;gulping   Effective Latch During Feeding yes   Suck/Swallow/Breathing Coordination present   Prefeeding Weight (gm) 2455 g (86.6 oz)   Postfeeding Weight (gm) 2490 g (87.8 oz)   Weight Gain/Loss (gm)  35 g (1.2 oz)   Breastfeeding Session   Breastfeeding breastfeeding, bilateral   Breastfeeding Time, Left (min) 17   Breastfeeding Time, Right (min) 13   Infant-Driven Feeding Readiness©   Infant-Driven Feeding Scales - Readiness 2   Infant-Driven Feeding Scales - Quality 1   Infant-Driven Feeding Scales - Caregiver Strategies A   LATCH Score   Latch 2-->grasps breast, tongue down, lips flanged, rhythmic sucking   Audible Swallowing 2-->spontaneous and intermittent (24 hrs old)   Type of Nipple 2-->everted (after stimulation)    Comfort (Breast/Nipple) 2-->soft/nontender   Hold (Positioning) 1-->minimal assist, teach one side, mother does other, staff holds   Latch Score 9

## 2025-05-12 ENCOUNTER — PATIENT OUTREACH (OUTPATIENT)
Dept: LABOR AND DELIVERY | Facility: HOSPITAL | Age: 30
End: 2025-05-12
Payer: COMMERCIAL

## 2025-05-12 NOTE — OUTREACH NOTE
Motherhood Connection  Postpartum Check-In    Questions/Answers      Flowsheet Row Responses   Visit Setting Telephone   Best Method for Contacting Cell   OB Discharge Note Reviewed  Reviewed   OB Discharge Navigator Reviewed  Reviewed   OB Discharge Medications Reviewed  Reviewed    discharged home with mother? Yes   Current Pain Levels 0-10 0   Verbalized Emotional State Acceptance   Family/Support Network Significant Other, Sibling   Level of Involvement in Care Supportive, Interactive   Do you feel comfortable in your relationship with your baby? Yes   Have members of your household adjusted to your baby? Yes   Is the baby's father supportive and/or involved with the baby? Yes   How does your partner feel about the baby? Involved, Happy   Do you feel safe at home, school and work? Yes   Are you in a relationship with someone who threatens you or hurts you? No   Do you have the resources to keep yourself and your baby healthy and safe? Yes   Lochia (per patient report) Rubra   Amount Scant   Number of pads per day 2   Lochia Odor None   Is patient breastfeeding? Yes, pumping   pumping - Left Breast Nontender   Pumping - Right Breast Nontender   Pumping - Left Nipple Intact   Pumping - Right Nipple Intact   Frequency once daily   Pumping Quantity 105 ml   Storage of Milk freezer   Postpartum Depression Screening Education Education Provided   Doctor Appointments: Education Provided   Breastfeeding Education Education Provided   Family Planning Education Education Provided   Postpartum Care Education Education Provided   S & S to report Education Provided   Followup Appointments Made Yes   Well Child Visit Appointments Made Yes   Appointment Date 06/10/25   Provider/Agency [facility]   Well Child Checkup Provider Name Camille   Well Child Check Up Date: 25   Did you complete the visit? Yes   Were there any specific concerns? Yes   Concerns: weight loss   Has additional follow-up with pediatrician or  specialist been recommended? Yes   Follow-Up Recommended: weight check, now stable   Umbilical Cord No reported signs or symptoms   Infant Feeding Method Breast   Breastfeeding Right   Time breastfeeding left: 10-15   Time breastfeeding right: 10-15   Frequency of feedings Q3H   Number of wet diapers x 24 hours 6   Last BM x 24 hours 4   Emesis (Unmeasured Occurence) no   What safe sleep surface is available? Bassinet   Are there stuffed animals, toys, pillows, quilts, blankets, wedges, positioners, bumpers or other loose bedding in the infant's sleeping environment? No   Where does the baby usually sleep? Bassinet   Does the baby ever share a sleep surface with a sibling, adult or pet? No   Does the baby ever share a sleep surface in a bed, couch, recliner or other? No   What position do you place your baby to sleep for naps? Back   What position do you place your baby to sleep at night Back   Are you and/or other caregivers smoking inside or outside the baby's home? No   Is the infant dressed appropiately for the temperature of the home? Yes   Do you use a clean, dry pacifier that is not attached to a string or stuffed animal? Yes            Review of Systems   Constitutional: Negative.    HENT: Negative.     Eyes: Negative.    Respiratory: Negative.     Cardiovascular: Negative.    Gastrointestinal: Negative.    Endocrine: Negative.    Genitourinary:  Positive for vaginal bleeding.        Lochia WNL   Musculoskeletal: Negative.    Skin: Negative.    Allergic/Immunologic: Negative.    Neurological: Negative.    Hematological: Negative.    Psychiatric/Behavioral: Negative.         Most Recent [place]  Depression Scale Score (EPDS)    Performed by a clinician: (Patient-Rptd) 3 (5/10/2025  9:05 AM)    Received via [employer] questionnaire: 3 (2025)       5 Ps Screen  Completed    Feeling well since going home. Minimal pain and lochia WNL. States mood has been stable. Reports good family support.  Breastfeeding is going better since seeing lactation for assistance. Pumping usually once a day at present.     Initial concerns of weight loss at first ped visit. Baby doing well with no concerns at follow up appointment after increased feedings.     No community resource needs at present. States previously provided resources were helpful and she was able to get assistance with her utility bill. Updated resources provided via [employer] message. RN from call center to f/u next week. Contact information provided. Encouraged to call with questions, concerns, or for support before then.    ANTONIO Dupree RN  Maternity Nurse Navigator    5/12/2025, 17:37 EDT

## 2025-05-19 ENCOUNTER — PATIENT OUTREACH (OUTPATIENT)
Dept: CALL CENTER | Facility: HOSPITAL | Age: 30
End: 2025-05-19
Payer: COMMERCIAL

## 2025-05-19 NOTE — OUTREACH NOTE
Motherhood Connection Survey      Flowsheet Row Responses   [facility] facility patient discharged from? [place]   Week 1 attempt successful? Yes   Call start time 1357   Call end time 1404   Baby sex Girl   Baby sex Girl   Delivery type Vaginal   Emotional state Acceptance   Family support Yes   Do you have all necessary resources to care for you and your baby?  Yes   Have members of your household adjusted to your baby? Yes   Did you have any problems with pre-eclampsia during this pregnancy? No   Do you have any of the following: No Issues   Did you have blood glucose issues during this pregnancy No   Lochia amount Light   Lochia per patient report Serosa   Did you have an episiotomy/tear/abdominal incision? No   Feeding Method Breast   Frequency every 2 hours   Duration 20 minutes   Pumping Yes  [when infant is napping.]   Supplementing Breast Milk   Breast Condition No   Nipple Condition No   Nursing Interventions Supportive bra   Signs baby is ready to eat Rooting, Crying, Finger sucking   Feeding tolerance Adequate pause for breath, Weight gain, Wet/dirty diapers   Number of wet diapers x 24 hours 10   Last BM x 24 hours 6   Umbilical Cord No reported signs or symptoms   Where does the baby usually sleep? Bassinet   Are there stuffed animals, toys, pillows, quilts, blankets, wedges, positioners, bumpers or other loose bedding in the infant's sleeping environment? No   Does the baby ever share a sleep surface in a bed, couch, recliner or other? No   What position do you lay your baby down to sleep? Back   Are you and/or other caregivers smoking inside or outside the baby's home? No   Mom appointment comments: Have scheduled PP appt for Cece 10.   Baby appointment comments: Ped appt completed. Next appt on Thursday, 5/22/25.   Call completed? Yes              Mary CONTRERAS - Registered Nurse

## 2025-06-20 ENCOUNTER — POSTPARTUM VISIT (OUTPATIENT)
Dept: OBSTETRICS AND GYNECOLOGY | Facility: CLINIC | Age: 30
End: 2025-06-20
Payer: COMMERCIAL

## 2025-06-20 VITALS
WEIGHT: 134.6 LBS | SYSTOLIC BLOOD PRESSURE: 106 MMHG | BODY MASS INDEX: 25.41 KG/M2 | DIASTOLIC BLOOD PRESSURE: 76 MMHG | HEIGHT: 61 IN

## 2025-06-20 DIAGNOSIS — Z11.3 SCREENING EXAMINATION FOR STD (SEXUALLY TRANSMITTED DISEASE): ICD-10-CM

## 2025-06-20 DIAGNOSIS — Z30.011 ENCOUNTER FOR INITIAL PRESCRIPTION OF CONTRACEPTIVE PILLS: ICD-10-CM

## 2025-06-20 LAB
B-HCG UR QL: NEGATIVE
EXPIRATION DATE: NORMAL
INTERNAL NEGATIVE CONTROL: NEGATIVE
INTERNAL POSITIVE CONTROL: POSITIVE
Lab: NORMAL

## 2025-06-20 RX ORDER — NORETHINDRONE ACETATE AND ETHINYL ESTRADIOL 1MG-20(21)
1 KIT ORAL DAILY
Qty: 84 TABLET | Refills: 3 | Status: SHIPPED | OUTPATIENT
Start: 2025-06-20 | End: 2026-06-20

## 2025-06-20 NOTE — PROGRESS NOTES
"        Chief Complaint   Patient presents with    Postpartum Care       Postpartum Visit         Li Stinson is a 30 y.o.  who presents today for a 6 week(s) postpartum check.     C/S: no     Vaginal, Spontaneous   Information for the patient's :  Jaylin Sequeira [[id number]]   2025   female   Jaylin Sequeira   2565 g (5 lb 10.5 oz)   Gestational Age: 38w1d       Baby Discharged: Discharged with Mom  Delivering Physician: Kaila Alvarado MD    Her pregnancy was complicated by no known issues. The patient did not have a laceration or episiotomy  is healing well. Patient describes vaginal bleeding as absent.  Patient is bottle feeding.  She desires OCPs for contraception. LMP 25.    She would like to discuss the following complaints today: None.    Patient denies concerns for postpartum depression/anxiety. Patient denies  suicidal or homicidal ideation. Her postpartum depression screening questionnaire: 0. No treatment is indicated      Last Pap : 10/1/24. Results: negative. HPV: negative per pt.   Last Completed Pap Smear    This patient has no relevant Health Maintenance data.           The additional following portions of the patient's history were reviewed and updated as appropriate: allergies and current medications.    Review of Systems   Constitutional: Negative.    HENT: Negative.     Eyes: Negative.    Respiratory: Negative.     Cardiovascular: Negative.    Gastrointestinal: Negative.    Endocrine: Negative.    Genitourinary: Negative.    Musculoskeletal: Negative.    Skin: Negative.    Allergic/Immunologic: Negative.    Neurological: Negative.    Hematological: Negative.    Psychiatric/Behavioral: Negative.       All other systems reviewed and are negative.     I have reviewed and agree with the HPI, ROS, and historical information as entered above. Jing Quan, APRN      /76   Ht 154.9 cm (61\")   Wt 61.1 kg (134 lb 9.6 oz)   LMP 2025 (Approximate)   " Breastfeeding No   BMI 25.43 kg/m²     Physical Exam  Vitals and nursing note reviewed. Exam conducted with a chaperone present.   Constitutional:       Appearance: She is well-developed.   HENT:      Head: Normocephalic and atraumatic.   Pulmonary:      Effort: Pulmonary effort is normal.   Abdominal:      Palpations: Abdomen is soft. Abdomen is not rigid.   Genitourinary:     Vagina: No lesions or prolapsed vaginal walls.      Cervix: No lesion or erythema.      Uterus: Enlarged. Not tender and no uterine prolapse.       Adnexa:         Right: No mass, tenderness or fullness.          Left: No mass, tenderness or fullness.        Rectum: Normal.   Musculoskeletal:      Cervical back: Normal range of motion.   Neurological:      Mental Status: She is alert and oriented to person, place, and time.   Psychiatric:         Attention and Perception: Attention normal.         Mood and Affect: Mood normal.         Behavior: Behavior normal. Behavior is cooperative.         Thought Content: Thought content normal. Thought content does not include suicidal ideation.         Cognition and Memory: Cognition normal.         Judgment: Judgment normal.             Assessment and Plan    Problem List Items Addressed This Visit    None  Visit Diagnoses         6 weeks postpartum follow-up    -  Primary      Encounter for initial prescription of contraceptive pills        Relevant Medications    norethindrone-ethinyl estradiol FE (Junel FE 1/20) 1-20 MG-MCG per tablet    Other Relevant Orders    POC Pregnancy, Urine (Completed)      Screening examination for STD (sexually transmitted disease)        Relevant Orders    Chlamydia trachomatis, Neisseria gonorrhoeae, Trichomonas vaginalis, PCR - Swab, Cervix            S/p Vaginal delivery, 6 week(s) postpartum.  Doing well.    Return to normal physical activity.  No pelvic restrictions.   Discussed risks/benefits of LYNN. Denies hx breast cancer, blood clots, stroke, migraine with  aura, or heart attack. Denies smoking or recent history of smoking. Discussed side effects of birth control including abnormal menses, weight gain, acne, and mood changes.  Verbalizes understanding.     Baby doing well.  Bottlefeeding going well.  No si/sx of postpartum depression  Contraception: contraceptive methods: OCP (estrogen/progesterone)  Nuswab for std screening (had intercourse during pelvic rest) UPT negative  Follow up PRN/Annual    Jing Quan, APRN  06/20/2025

## 2025-06-23 LAB
C TRACH RRNA SPEC QL NAA+PROBE: NEGATIVE
N GONORRHOEA RRNA SPEC QL NAA+PROBE: NEGATIVE
T VAGINALIS RRNA SPEC QL NAA+PROBE: NEGATIVE